# Patient Record
Sex: FEMALE | Race: ASIAN | NOT HISPANIC OR LATINO | Employment: UNEMPLOYED | URBAN - METROPOLITAN AREA
[De-identification: names, ages, dates, MRNs, and addresses within clinical notes are randomized per-mention and may not be internally consistent; named-entity substitution may affect disease eponyms.]

---

## 2017-10-05 ENCOUNTER — COMMUNICATION - HEALTHEAST (OUTPATIENT)
Dept: FAMILY MEDICINE | Facility: CLINIC | Age: 52
End: 2017-10-05

## 2017-10-05 ENCOUNTER — OFFICE VISIT - HEALTHEAST (OUTPATIENT)
Dept: FAMILY MEDICINE | Facility: CLINIC | Age: 52
End: 2017-10-05

## 2017-10-05 DIAGNOSIS — R92.8 ABNORMAL MAMMOGRAM: ICD-10-CM

## 2017-10-05 DIAGNOSIS — Z23 NEED FOR INFLUENZA VACCINATION: ICD-10-CM

## 2017-10-05 DIAGNOSIS — Z71.84 TRAVEL ADVICE ENCOUNTER: ICD-10-CM

## 2017-10-05 ASSESSMENT — MIFFLIN-ST. JEOR: SCORE: 1061.75

## 2017-11-01 ENCOUNTER — RECORDS - HEALTHEAST (OUTPATIENT)
Dept: ADMINISTRATIVE | Facility: OTHER | Age: 52
End: 2017-11-01

## 2017-12-11 ENCOUNTER — RECORDS - HEALTHEAST (OUTPATIENT)
Dept: MAMMOGRAPHY | Facility: CLINIC | Age: 52
End: 2017-12-11

## 2017-12-11 DIAGNOSIS — R92.8 OTHER ABNORMAL AND INCONCLUSIVE FINDINGS ON DIAGNOSTIC IMAGING OF BREAST: ICD-10-CM

## 2017-12-11 DIAGNOSIS — Z12.31 ENCOUNTER FOR SCREENING MAMMOGRAM FOR MALIGNANT NEOPLASM OF BREAST: ICD-10-CM

## 2017-12-19 ENCOUNTER — RECORDS - HEALTHEAST (OUTPATIENT)
Dept: ADMINISTRATIVE | Facility: OTHER | Age: 52
End: 2017-12-19

## 2018-02-02 ENCOUNTER — RECORDS - HEALTHEAST (OUTPATIENT)
Dept: GENERAL RADIOLOGY | Facility: CLINIC | Age: 53
End: 2018-02-02

## 2018-02-02 ENCOUNTER — OFFICE VISIT - HEALTHEAST (OUTPATIENT)
Dept: FAMILY MEDICINE | Facility: CLINIC | Age: 53
End: 2018-02-02

## 2018-02-02 DIAGNOSIS — R50.9 FEVER, UNSPECIFIED: ICD-10-CM

## 2018-02-02 DIAGNOSIS — N39.0 UTI (URINARY TRACT INFECTION): ICD-10-CM

## 2018-02-02 DIAGNOSIS — J02.0 STREP THROAT: ICD-10-CM

## 2018-02-02 DIAGNOSIS — R93.89 ABNORMAL CXR: ICD-10-CM

## 2018-02-02 DIAGNOSIS — R50.9 FEVER: ICD-10-CM

## 2018-02-02 DIAGNOSIS — R07.0 THROAT PAIN: ICD-10-CM

## 2018-02-02 LAB
ALBUMIN SERPL-MCNC: 3.4 G/DL (ref 3.5–5)
ALBUMIN UR-MCNC: ABNORMAL MG/DL
ALP SERPL-CCNC: 120 U/L (ref 45–120)
ALT SERPL W P-5'-P-CCNC: 17 U/L (ref 0–45)
ANION GAP SERPL CALCULATED.3IONS-SCNC: 16 MMOL/L (ref 5–18)
APPEARANCE UR: CLEAR
AST SERPL W P-5'-P-CCNC: 15 U/L (ref 0–40)
BACTERIA #/AREA URNS HPF: ABNORMAL HPF
BILIRUB SERPL-MCNC: 0.6 MG/DL (ref 0–1)
BILIRUB UR QL STRIP: NEGATIVE
BUN SERPL-MCNC: 11 MG/DL (ref 8–22)
CALCIUM SERPL-MCNC: 9.8 MG/DL (ref 8.5–10.5)
CHLORIDE BLD-SCNC: 98 MMOL/L (ref 98–107)
CO2 SERPL-SCNC: 24 MMOL/L (ref 22–31)
COLOR UR AUTO: YELLOW
CREAT SERPL-MCNC: 0.79 MG/DL (ref 0.6–1.1)
DEPRECATED S PYO AG THROAT QL EIA: NORMAL
FLUAV AG SPEC QL IA: NORMAL
FLUBV AG SPEC QL IA: NORMAL
GFR SERPL CREATININE-BSD FRML MDRD: >60 ML/MIN/1.73M2
GLUCOSE BLD-MCNC: 104 MG/DL (ref 70–125)
GLUCOSE UR STRIP-MCNC: NEGATIVE MG/DL
HGB UR QL STRIP: ABNORMAL
KETONES UR STRIP-MCNC: ABNORMAL MG/DL
LEUKOCYTE ESTERASE UR QL STRIP: ABNORMAL
NITRATE UR QL: POSITIVE
PH UR STRIP: 6.5 [PH] (ref 5–8)
POTASSIUM BLD-SCNC: 3.5 MMOL/L (ref 3.5–5)
PROT SERPL-MCNC: 8.7 G/DL (ref 6–8)
RBC #/AREA URNS AUTO: ABNORMAL HPF
SODIUM SERPL-SCNC: 138 MMOL/L (ref 136–145)
SP GR UR STRIP: 1.01 (ref 1–1.03)
SQUAMOUS #/AREA URNS AUTO: ABNORMAL LPF
UROBILINOGEN UR STRIP-ACNC: ABNORMAL
WBC #/AREA URNS AUTO: >100 HPF

## 2018-02-03 LAB — GROUP A STREP BY PCR: ABNORMAL

## 2018-02-04 LAB — BACTERIA SPEC CULT: ABNORMAL

## 2018-02-05 ENCOUNTER — COMMUNICATION - HEALTHEAST (OUTPATIENT)
Dept: LAB | Facility: CLINIC | Age: 53
End: 2018-02-05

## 2018-02-06 ENCOUNTER — RECORDS - HEALTHEAST (OUTPATIENT)
Dept: ADMINISTRATIVE | Facility: OTHER | Age: 53
End: 2018-02-06

## 2018-02-09 ENCOUNTER — OFFICE VISIT - HEALTHEAST (OUTPATIENT)
Dept: FAMILY MEDICINE | Facility: CLINIC | Age: 53
End: 2018-02-09

## 2018-02-09 ENCOUNTER — COMMUNICATION - HEALTHEAST (OUTPATIENT)
Dept: FAMILY MEDICINE | Facility: CLINIC | Age: 53
End: 2018-02-09

## 2018-02-09 DIAGNOSIS — R93.89 ABNORMAL CXR: ICD-10-CM

## 2018-02-09 DIAGNOSIS — M54.9 BACKACHE: ICD-10-CM

## 2018-02-09 DIAGNOSIS — B34.9 VIRAL SYNDROME: ICD-10-CM

## 2018-02-09 ASSESSMENT — MIFFLIN-ST. JEOR: SCORE: 1034.53

## 2018-02-19 ENCOUNTER — HOSPITAL ENCOUNTER (OUTPATIENT)
Dept: CT IMAGING | Facility: HOSPITAL | Age: 53
Discharge: HOME | End: 2018-02-19
Attending: FAMILY MEDICINE

## 2018-02-19 DIAGNOSIS — R93.89 ABNORMAL CXR: ICD-10-CM

## 2018-02-23 ENCOUNTER — OFFICE VISIT - HEALTHEAST (OUTPATIENT)
Dept: FAMILY MEDICINE | Facility: CLINIC | Age: 53
End: 2018-02-23

## 2018-02-23 DIAGNOSIS — N39.0 UTI (URINARY TRACT INFECTION): ICD-10-CM

## 2018-02-23 DIAGNOSIS — J98.4 APICAL LUNG SCARRING: ICD-10-CM

## 2018-02-23 DIAGNOSIS — M85.80 OSTEOPENIA: ICD-10-CM

## 2018-03-07 ENCOUNTER — RECORDS - HEALTHEAST (OUTPATIENT)
Dept: ADMINISTRATIVE | Facility: OTHER | Age: 53
End: 2018-03-07

## 2018-05-25 ENCOUNTER — RECORDS - HEALTHEAST (OUTPATIENT)
Dept: ADMINISTRATIVE | Facility: OTHER | Age: 53
End: 2018-05-25

## 2018-07-13 ENCOUNTER — RECORDS - HEALTHEAST (OUTPATIENT)
Dept: ADMINISTRATIVE | Facility: OTHER | Age: 53
End: 2018-07-13

## 2018-09-27 ENCOUNTER — RECORDS - HEALTHEAST (OUTPATIENT)
Dept: ADMINISTRATIVE | Facility: OTHER | Age: 53
End: 2018-09-27

## 2018-10-09 ENCOUNTER — OFFICE VISIT - HEALTHEAST (OUTPATIENT)
Dept: FAMILY MEDICINE | Facility: CLINIC | Age: 53
End: 2018-10-09

## 2018-10-09 DIAGNOSIS — J98.4 APICAL LUNG SCARRING: ICD-10-CM

## 2018-10-09 DIAGNOSIS — R07.89 CHEST WALL PAIN: ICD-10-CM

## 2018-10-09 DIAGNOSIS — H93.19 TINNITUS, UNSPECIFIED LATERALITY: ICD-10-CM

## 2018-10-30 ENCOUNTER — OFFICE VISIT - HEALTHEAST (OUTPATIENT)
Dept: AUDIOLOGY | Facility: CLINIC | Age: 53
End: 2018-10-30

## 2018-10-30 ENCOUNTER — OFFICE VISIT - HEALTHEAST (OUTPATIENT)
Dept: OTOLARYNGOLOGY | Facility: CLINIC | Age: 53
End: 2018-10-30

## 2018-10-30 DIAGNOSIS — H90.A22 SENSORINEURAL HEARING LOSS (SNHL) OF LEFT EAR WITH RESTRICTED HEARING OF RIGHT EAR: ICD-10-CM

## 2018-10-30 DIAGNOSIS — H90.3 ASNHL (ASYMMETRICAL SENSORINEURAL HEARING LOSS): ICD-10-CM

## 2018-10-30 DIAGNOSIS — H93.11 TINNITUS, RIGHT: ICD-10-CM

## 2018-10-30 DIAGNOSIS — H93.11 TINNITUS OF RIGHT EAR: ICD-10-CM

## 2019-01-03 ENCOUNTER — OFFICE VISIT - HEALTHEAST (OUTPATIENT)
Dept: FAMILY MEDICINE | Facility: CLINIC | Age: 54
End: 2019-01-03

## 2019-01-03 DIAGNOSIS — R07.89 CHEST WALL PAIN: ICD-10-CM

## 2019-01-03 RX ORDER — CLONAZEPAM 0.5 MG/1
0.5 TABLET ORAL DAILY
Status: SHIPPED | COMMUNITY
Start: 2019-01-03 | End: 2022-02-07

## 2019-01-03 RX ORDER — ESCITALOPRAM OXALATE 20 MG/1
20 TABLET ORAL DAILY
Status: SHIPPED | COMMUNITY
Start: 2019-01-03 | End: 2022-03-15

## 2019-08-05 ENCOUNTER — OFFICE VISIT - HEALTHEAST (OUTPATIENT)
Dept: FAMILY MEDICINE | Facility: CLINIC | Age: 54
End: 2019-08-05

## 2019-08-05 DIAGNOSIS — R07.89 CHEST WALL PAIN: ICD-10-CM

## 2019-08-05 DIAGNOSIS — M25.572 PAIN IN JOINT, ANKLE AND FOOT, LEFT: ICD-10-CM

## 2019-08-05 RX ORDER — INDOMETHACIN 25 MG/1
CAPSULE ORAL
Qty: 30 CAPSULE | Refills: 1 | Status: SHIPPED | OUTPATIENT
Start: 2019-08-05 | End: 2022-05-31

## 2019-08-05 RX ORDER — HYDROXYZINE PAMOATE 25 MG/1
25 CAPSULE ORAL 3 TIMES DAILY PRN
Status: SHIPPED | COMMUNITY
Start: 2019-08-05 | End: 2022-03-15

## 2019-08-05 RX ORDER — CYCLOBENZAPRINE HCL 5 MG
TABLET ORAL
Qty: 30 TABLET | Refills: 1 | Status: SHIPPED | OUTPATIENT
Start: 2019-08-05 | End: 2022-02-07

## 2019-08-05 ASSESSMENT — MIFFLIN-ST. JEOR: SCORE: 1083.97

## 2019-12-09 ENCOUNTER — OFFICE VISIT - HEALTHEAST (OUTPATIENT)
Dept: FAMILY MEDICINE | Facility: CLINIC | Age: 54
End: 2019-12-09

## 2019-12-09 DIAGNOSIS — G44.52 NEW DAILY PERSISTENT HEADACHE: ICD-10-CM

## 2019-12-09 DIAGNOSIS — M54.50 CHRONIC MIDLINE LOW BACK PAIN WITHOUT SCIATICA: ICD-10-CM

## 2019-12-09 DIAGNOSIS — R07.89 CHEST WALL PAIN: ICD-10-CM

## 2019-12-09 DIAGNOSIS — G89.29 CHRONIC MIDLINE LOW BACK PAIN WITHOUT SCIATICA: ICD-10-CM

## 2019-12-10 ENCOUNTER — HOSPITAL ENCOUNTER (OUTPATIENT)
Dept: CT IMAGING | Facility: HOSPITAL | Age: 54
Discharge: HOME | End: 2019-12-10
Attending: FAMILY MEDICINE

## 2019-12-10 DIAGNOSIS — G44.52 NEW DAILY PERSISTENT HEADACHE: ICD-10-CM

## 2019-12-11 ENCOUNTER — COMMUNICATION - HEALTHEAST (OUTPATIENT)
Dept: FAMILY MEDICINE | Facility: CLINIC | Age: 54
End: 2019-12-11

## 2020-06-25 ENCOUNTER — COMMUNICATION - HEALTHEAST (OUTPATIENT)
Dept: FAMILY MEDICINE | Facility: CLINIC | Age: 55
End: 2020-06-25

## 2020-09-29 ENCOUNTER — AMBULATORY - HEALTHEAST (OUTPATIENT)
Dept: FAMILY MEDICINE | Facility: CLINIC | Age: 55
End: 2020-09-29

## 2020-09-29 DIAGNOSIS — Z12.31 VISIT FOR SCREENING MAMMOGRAM: ICD-10-CM

## 2021-05-31 VITALS — WEIGHT: 125 LBS | BODY MASS INDEX: 26.24 KG/M2 | HEIGHT: 58 IN

## 2021-05-31 NOTE — PROGRESS NOTES
"ASSESSMENT/PLAN:  1. Pain in joint, ankle and foot, left  Ambulatory referral to Orthopedics   2. Chest wall pain  cyclobenzaprine (FLEXERIL) 5 MG tablet    indomethacin (INDOCIN) 25 MG capsule       This is a 55 yo female with chronic back pain after reported work injury (a few years ago).  She is followed by Ortho for her back concerns.  She is here today with:  1.  Pain in left ankle - this is accompanied by \"cracking\" sounds with ROM.  She believes this started after an injection in her back.  It was her 7th injection of some sort and she tells me they \"put me to sleep\".  She is convinced that the ankle symptoms have everything to do with this injection.  REview of records would suggest that her last injection was a radiofrequency ablation of a lumbar nerve root.  I reassured her that they did not do anything to her ankle at that visit.  She does have fairly pronounced \"grinding\" in that ankle - now with decreased walking due to the symptoms.  I think she needs an ortho referral for further evaluation of this ankle - I will defer any imaging to that provider.  2.  Chest wall pain - this resulted from her reported work accident.  She has taken Cyclobenzaprine as needed for chest pain - \"it helps\" - she got a new prescription in January (> 6 months ago) for #30 and still has a couple pills left.  She requests a refill today.  This seems reasonable.  She is also looking for pain relief for her ankle/chest.  Will renew her Indomethacin to use as needed for pain.   No follow-ups on file.      Medications Discontinued During This Encounter   Medication Reason     cyclobenzaprine (FLEXERIL) 5 MG tablet Reorder     indomethacin (INDOCIN) 25 MG capsule Reorder     There are no Patient Instructions on file for this visit.    Chief Complaint:  Chief Complaint   Patient presents with     Leg Pain       HPI:   Dedra Montero is a 54 y.o. female c/o  Needs a refill on medication - sent to [facility] - Cyclobenzaprine  5 mg " "  Takes for chest pain -     Left leg - still numb - had injury at work -   Ankle is cracking - difficult to walk - doesn't hurt - just the numbness  Low back pain is the problem - whether sitting or standing  Has been getting injections - hasn't been helpful yet  Last injection was about 5 months ago -   [facility] is seeing her for the back  Injured in lower back, radiates to left leg, but never to the ankle    They have not addressed the ankle as well - just started 4-5 months ago  No specific injury to the ankle -   No pain, just numbness and tingling -   Now, cracking - but not painful    Had total of 7 injections - in her back -   For 7th injection they \"put me to sleep\"        PMH:   Patient Active Problem List    Diagnosis Date Noted     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     Vitamin D deficiency 08/02/2016     Health care maintenance 01/28/2016     Pyelonephritis, unspecified 01/07/2015     Chest Pain      Hearing Loss      Peripheral Neuropathy      Arthralgia      Abnormal Pap Smear Of Cervix      Past Medical History:   Diagnosis Date     Menopause 2014     Peripheral neuropathy      Past Surgical History:   Procedure Laterality Date     NO PAST SURGERIES       Social History     Socioeconomic History     Marital status:      Spouse name: Not on file     Number of children: Not on file     Years of education: Not on file     Highest education level: Not on file   Occupational History     Occupation:    Social Needs     Financial resource strain: Not on file     Food insecurity:     Worry: Not on file     Inability: Not on file     Transportation needs:     Medical: Not on file     Non-medical: Not on file   Tobacco Use     Smoking status: Never Smoker     Smokeless tobacco: Never Used   Substance and Sexual Activity     Alcohol use: No     Drug use: No     Sexual activity: Yes     Partners: Male     Birth control/protection: Post-menopausal   Lifestyle     Physical activity:     Days " per week: Not on file     Minutes per session: Not on file     Stress: Not on file   Relationships     Social connections:     Talks on phone: Not on file     Gets together: Not on file     Attends Zoroastrian service: Not on file     Active member of club or organization: Not on file     Attends meetings of clubs or organizations: Not on file     Relationship status: Not on file     Intimate partner violence:     Fear of current or ex partner: Not on file     Emotionally abused: Not on file     Physically abused: Not on file     Forced sexual activity: Not on file   Other Topics Concern     Not on file   Social History Narrative    The patient lives with  and family.    Born in [facility], in [facility] x 13 years, to  1990     Family History   Problem Relation Age of Onset     No Medical Problems Mother      No Medical Problems Father      No Medical Problems Sister      No Medical Problems Brother      No Medical Problems Brother      No Medical Problems Brother      No Medical Problems Sister      No Medical Problems Sister      No Medical Problems Sister        Meds:    Current Outpatient Medications:      clonazePAM (KLONOPIN) 0.5 MG tablet, Take 0.5 mg by mouth daily., Disp: , Rfl:      cyclobenzaprine (FLEXERIL) 5 MG tablet, 1 po two times a day as needed for chest pain, Disp: 30 tablet, Rfl: 1     escitalopram oxalate (LEXAPRO) 20 MG tablet, Take 20 mg by mouth daily., Disp: , Rfl:      hydrOXYzine pamoate (VISTARIL) 25 MG capsule, Take 25 mg by mouth 3 (three) times a day as needed for itching., Disp: , Rfl:      cholecalciferol, vitamin D3, 2,000 unit cap, Take 2,000 Units by mouth daily., Disp: 30 each, Rfl: 6     indomethacin (INDOCIN) 25 MG capsule, 1 PO Q 8-12 HOURS AS NEEDED FOR CHEST WALL PAIN, Disp: 30 capsule, Rfl: 1    Allergies:  No Known Allergies    ROS:  Pertinent positives as noted in HPI; otherwise 12 point ROS negative.      Physical Exam:  EXAM:  /60 (Patient Site: Right Arm, Patient  "Position: Sitting, Cuff Size: Adult Regular)   Pulse 71   Temp 97.8  F (36.6  C) (Oral)   Resp 16   Ht 4' 10\" (1.473 m)   Wt 129 lb 14.4 oz (58.9 kg)   LMP 03/13/2015   Breastfeeding? No   BMI 27.15 kg/m     Gen:  NAD, appears well, well-hydrated  HEENT:  TMs nl, oropharynx benign, nasal mucosa nl, conjunctiva clear  Neck:  Supple, no adenopathy, no thyromegaly, no carotid bruits, no JVD  Lungs:  Clear to auscultation bilaterally  Chest Wall - no significant tenderness to palpation today  Cor:  RRR no murmur  Abd:  Soft, nontender, BS+, no masses, no guarding or rebound, no HSM  Back:  Decreased ROM lumbar spine  Extr:  Left ankle makes stretching/cracking noise with ROM - no instability noted, nl ROM  Neuro:  No asymmetry, Nl motor tone/strength, decreased sensation to touch in LLE, reflexes =, gait nl, nl coordination, CN intact,   Skin:  Warm/dry        Results:  "

## 2021-06-01 VITALS — WEIGHT: 119 LBS | BODY MASS INDEX: 24.98 KG/M2 | HEIGHT: 58 IN

## 2021-06-01 VITALS — WEIGHT: 123 LBS | BODY MASS INDEX: 25.71 KG/M2

## 2021-06-01 VITALS — WEIGHT: 123.5 LBS | BODY MASS INDEX: 25.81 KG/M2

## 2021-06-02 VITALS — BODY MASS INDEX: 26.75 KG/M2 | WEIGHT: 128 LBS

## 2021-06-02 VITALS — WEIGHT: 125 LBS | BODY MASS INDEX: 26.13 KG/M2

## 2021-06-03 VITALS — HEIGHT: 58 IN | WEIGHT: 129.9 LBS | BODY MASS INDEX: 27.27 KG/M2

## 2021-06-04 VITALS
SYSTOLIC BLOOD PRESSURE: 120 MMHG | HEART RATE: 69 BPM | DIASTOLIC BLOOD PRESSURE: 60 MMHG | OXYGEN SATURATION: 97 % | TEMPERATURE: 97.5 F | BODY MASS INDEX: 26.75 KG/M2 | WEIGHT: 128 LBS

## 2021-06-04 NOTE — TELEPHONE ENCOUNTER
"Attempted to call the patient and got a message saying,\" to the subscriber's request, this person is not accepting incoming calls.\" Will try again later.  "

## 2021-06-04 NOTE — PROGRESS NOTES
"ASSESSMENT/PLAN:  1. New daily persistent headache  CT Head Without Contrast    ibuprofen (ADVIL,MOTRIN) 600 MG tablet   2. Chest wall pain     3. Chronic midline low back pain without sciatica  ibuprofen (ADVIL,MOTRIN) 600 MG tablet       This is a 55 yo female with:  1.  New daily persistent headache - no red flag neuro symptoms, but patient is insistent that \"something is wrong in her head\".  We will treat with Ibuprofen as needed for pain.  Will also check Ct - head.  2.  Chest Wall pain - persistent - no signs of underlying disease - treat symptomatically with Ibuprofen.   3.  Chronic low back pain - treat with Ibuprofen.   Return in about 1 month (around 1/9/2020) for if not getting better.      There are no discontinued medications.  There are no Patient Instructions on file for this visit.    Chief Complaint:  Chief Complaint   Patient presents with     Headache     right side of head     Pain     left hip and foot       HPI:   Dedra Montero is a 54 y.o. female c/o  1.  Low back pain x 2-3 years  2.  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     Vitamin D deficiency 08/02/2016     Health care maintenance 01/28/2016     Pyelonephritis, unspecified 01/07/2015     Chest Pain      Hearing Loss      Peripheral Neuropathy      Arthralgia      Abnormal Pap Smear Of Cervix      Past Medical History:   Diagnosis Date     Menopause 2014     Peripheral neuropathy      Past Surgical History:   Procedure Laterality Date     NO PAST SURGERIES       Social History     Socioeconomic History     Marital status:      Spouse name: Not on file     Number of children: Not on file     " Years of education: Not on file     Highest education level: Not on file   Occupational History     Occupation:    Social Needs     Financial resource strain: Not on file     Food insecurity:     Worry: Not on file     Inability: Not on file     Transportation needs:     Medical: Not on file     Non-medical: Not on file   Tobacco Use     Smoking status: Never Smoker     Smokeless tobacco: Never Used   Substance and Sexual Activity     Alcohol use: No     Drug use: No     Sexual activity: Yes     Partners: Male     Birth control/protection: Post-menopausal   Lifestyle     Physical activity:     Days per week: Not on file     Minutes per session: Not on file     Stress: Not on file   Relationships     Social connections:     Talks on phone: Not on file     Gets together: Not on file     Attends Druze service: Not on file     Active member of club or organization: Not on file     Attends meetings of clubs or organizations: Not on file     Relationship status: Not on file     Intimate partner violence:     Fear of current or ex partner: Not on file     Emotionally abused: Not on file     Physically abused: Not on file     Forced sexual activity: Not on file   Other Topics Concern     Not on file   Social History Narrative    The patient lives with  and family.    Born in [facility], in [facility] x 13 years, to  1990     Family History   Problem Relation Age of Onset     No Medical Problems Mother      No Medical Problems Father      No Medical Problems Sister      No Medical Problems Brother      No Medical Problems Brother      No Medical Problems Brother      No Medical Problems Sister      No Medical Problems Sister      No Medical Problems Sister        Meds:    Current Outpatient Medications:      cholecalciferol, vitamin D3, 2,000 unit cap, Take 2,000 Units by mouth daily., Disp: 30 each, Rfl: 6     clonazePAM (KLONOPIN) 0.5 MG tablet, Take 0.5 mg by mouth daily., Disp: , Rfl:       cyclobenzaprine (FLEXERIL) 5 MG tablet, 1 po two times a day as needed for chest pain, Disp: 30 tablet, Rfl: 1     escitalopram oxalate (LEXAPRO) 20 MG tablet, Take 20 mg by mouth daily., Disp: , Rfl:      hydrOXYzine pamoate (VISTARIL) 25 MG capsule, Take 25 mg by mouth 3 (three) times a day as needed for itching., Disp: , Rfl:      indomethacin (INDOCIN) 25 MG capsule, 1 PO Q 8-12 HOURS AS NEEDED FOR CHEST WALL PAIN, Disp: 30 capsule, Rfl: 1     ibuprofen (ADVIL,MOTRIN) 600 MG tablet, Take 1 tablet (600 mg total) by mouth every 6 (six) hours as needed for pain., Disp: 100 tablet, Rfl: 1    Allergies:  No Known Allergies    ROS:  Pertinent positives as noted in HPI; otherwise 12 point ROS negative.      Physical Exam:  EXAM:  /60 (Patient Site: Left Arm, Patient Position: Sitting, Cuff Size: Adult Regular)   Pulse 69   Temp 97.5  F (36.4  C) (Oral)   Wt 128 lb (58.1 kg)   LMP 03/13/2015   SpO2 97%   BMI 26.75 kg/m     Gen:  NAD, appears well, well-hydrated  HEENT:  TMs nl, oropharynx benign, nasal mucosa nl, conjunctiva clear, PERRL, EOMI  Neck:  Supple, no adenopathy, no thyromegaly, no carotid bruits, no JVD  Lungs:  Clear to auscultation bilaterally  Cor:  RRR no murmur  Abd:  Soft, nontender, BS+, no masses, no guarding or rebound, no HSM  Extr:  Neg.  Neuro:  No asymmetry, Nl motor tone/strength, nl sensation, reflexes =, gait nl, nl coordination, CN intact,   Skin:  Warm/dry        Results:

## 2021-06-04 NOTE — TELEPHONE ENCOUNTER
Called and left message on [phone] to have patient return call. Okay to relay message below.

## 2021-06-04 NOTE — TELEPHONE ENCOUNTER
Patient Returning Call  Reason for call:  Returning clinic call.  Information relayed to patient:  With  # [id number] on the line with the caller, the patient was given the normal head CT result, patient was advised  to follow-up in clinic with any persisting symptom for further evaluation.  Patient expressed understanding of the information given.  Patient has additional questions:  No  If YES, what are your questions/concerns:  NA  Okay to leave a detailed message?: No call back needed

## 2021-06-04 NOTE — TELEPHONE ENCOUNTER
----- Message from Lilian Dunbar MD sent at 12/11/2019  2:47 PM CST -----  Please notify patient that her CT scan of her head is normal - nothing to suggest a stroke or other cause for her symptoms.

## 2021-06-13 NOTE — PROGRESS NOTES
Going November 6   One month.    [facility]    Travel advice.  No issues    ROS: as noted above    OBJECTIVE:   Vitals:    10/05/17 1534   BP: 101/69   Pulse: (!) 57   Resp: 20      Eyes: non icteric, noninflamed  Lungs: no resp distress  Heart: regular  Ankles: no edema  Muscles: nontender  Mental status: euthymic  Neuro: nonfocal  Chart review shows abnl mammogram lost to follow up  ASSESSMENT/PLAN:    1. Need for influenza vaccination  Influenza, Seasonal Quad, Preservative Free 36+ Months   2. Travel advice encounter  doxycycline (MONODOX) 100 MG capsule    typhoid (VIVOTIF) SR capsule   3. Abnormal mammogram  Mammo Screening Bilateral     More than 15 of 25 minutes total time spent education counseling regarding the issues and care of same as listed in the assessment and plan of this note  Will assist in follow up studies of abnl mammogram a year ago.  Discussion education

## 2021-06-15 NOTE — PROGRESS NOTES
ASSESSMENT/PLAN:  Pleasant 52 y.o.  female presents with fever and found to have UTI.  I will give her Bactrim.  The patient may continue with OTC symptomatic treatment.  Rest, hydration, nutritional support, and time were counseled.  I asked her follow up with her primary care provider next week.  Incidentally a chest x-ray which was done to evaluate the fever is found to have abnormality at the left clavicular and rib areas.  A chest CT scan was ordered and the patient will call to schedule that CT scan and follow-up with her primary care provider regarding the results and the management. The patient verbalized understanding and agreed with the plan.      Fever, Throat pain, dysuria, cough  -     Rapid Strep A Screen-Throat  -     Influenza A/B Rapid Test  -     Group A Strep, RNA Direct Detection, Throat  -     XR Chest 2 Views; Future  -     HM1(CBC and Differential)  -     Comprehensive Metabolic Panel  -     HM1 (CBC with Diff)  -     Culture, Urine  -     Urinalysis    Abnormal CXR  -     CT Chest With and Without Contrast; Future    UTI (urinary tract infection)  -     sulfamethoxazole-trimethoprim (BACTRIM DS) 800-160 mg per tablet; Take 1 tablet by mouth 2 (two) times a day for 10 days.    SUBJECTIVE:    Dedra Montero is a 52 y.o. female who comes in today with her  for 1 week of fever with a T-max noted of 103.1.  In addition to the fever, she endorsed chills, myalgia, malaise, nausea, decrease in appetite, sore throat, cough, dysuria, abdominal pain, back pain, headache, chest pain, shortness of breath.  She has been medicating with Tylenol and Advil with some minimal relief only to find recurrence of her fever.  Her  was sick with similar symptoms a couple weeks ago and has been better with the use of antibiotics.  The patient and  travel to [facility] a month ago from November to December and was given malaria prophylactic treatment and typhoid vaccine for which she completed.  The patient is  otherwise very healthy and takes no chronic disease medications.  She is not a smoker.  She denies rash, neck stiffness, numbness and weakness of her extremities, bleeding per mouth/nose/urine/stool, diarrhea, vomiting.    Review of Systems (except those mentioned above)  Constitutional: Negative.   HENT: Negative.   Eyes: Negative.   Respiratory: Negative.   Cardiovascular: Negative.   Gastrointestinal: Negative.   Endocrine: Negative.   Genitourinary: Negative.   Musculoskeletal: Negative.   Skin: Negative.   Allergic/Immunologic: Negative.   Neurological: Negative.   Hematological: Negative.   Psychiatric/Behavioral: Negative.     Patient Active Problem List    Diagnosis Date Noted     Vitamin D deficiency 08/02/2016     Health care maintenance 01/28/2016     Pyelonephritis, unspecified 01/07/2015     Chest Pain      Hearing Loss      Peripheral Neuropathy      Arthralgia      Abnormal Pap Smear Of Cervix      No Known Allergies  Current Outpatient Prescriptions   Medication Sig Dispense Refill     cholecalciferol, vitamin D3, 2,000 unit cap Take 2,000 Units by mouth daily. 30 each 6     HYDROcodone-acetaminophen 5-325 mg per tablet        ibuprofen (ADVIL,MOTRIN) 600 MG tablet Take 1 tablet (600 mg total) by mouth as needed. 100 tablet 1     sulfamethoxazole-trimethoprim (BACTRIM DS) 800-160 mg per tablet Take 1 tablet by mouth 2 (two) times a day for 10 days. 20 tablet 0     typhoid (VIVOTIF) SR capsule Take 1 capsule by mouth every other day. 4 capsule 0     No current facility-administered medications for this visit.      Past Medical History:   Diagnosis Date     Menopause 2014     Peripheral neuropathy      Past Surgical History:   Procedure Laterality Date     NO PAST SURGERIES       Social History     Social History     Marital status:      Spouse name: N/A     Number of children: N/A     Years of education: N/A     Occupational History           Social History Main Topics     Smoking  status: Never Smoker     Smokeless tobacco: Never Used     Alcohol use No     Drug use: No     Sexual activity: Yes     Partners: Male     Birth control/ protection: Post-menopausal     Other Topics Concern     None     Social History Narrative    The patient lives with  and family.    Born in [facility], in [facility] x 13 years, to  1990     Family History   Problem Relation Age of Onset     No Medical Problems Mother      No Medical Problems Father      No Medical Problems Sister      No Medical Problems Brother      No Medical Problems Brother      No Medical Problems Brother      No Medical Problems Sister      No Medical Problems Sister      No Medical Problems Sister          OBJECTIVE:    Vitals:    02/02/18 1416   BP: 120/63   Pulse: 87   Resp: 24   Temp: 98  F (36.7  C)   TempSrc: Oral   SpO2: 97%   Weight: 123 lb (55.8 kg)     Body mass index is 25.71 kg/(m^2).    Physical Exam:  Constitutional: Patient is oriented to person, place, and time. Patient appears well-developed and well-nourished. No distress.   Head: Normocephalic and atraumatic.   Right Ear: External ear normal. Normal TM  Left Ear: External ear normal. Normal TM  Nose: Nose normal.   Mouth/Throat: Oropharynx is clear and moist. No oropharyngeal exudate.   Eyes: Conjunctivae and EOM are normal. Pupils are equal, round, and reactive to light. Right eye exhibits no discharge. Left eye exhibits no discharge. No scleral icterus.   Neck: Neck supple. No JVD present. No tracheal deviation present. No thyromegaly present.   Lymphadenopathy:  Patient has no cervical adenopathy.   Cardiovascular: Normal rate, regular rhythm, normal heart sounds and intact distal pulses. No murmur heard.   Pulmonary/Chest: Effort normal and breath sounds normal. No stridor. No respiratory distress. Patient has no wheezes, no rales, exhibits no tenderness.   Abdominal: Soft. Bowel sounds are normal. Patient exhibits no distension and no mass. There is no tenderness.  There is no rebound and no guarding.   Neurological: Patient is alert and oriented to person, place, and time. Patient has normal reflexes. No cranial nerve deficit. Coordination normal.   Skin: Skin is warm and dry. No rash noted. Patient is not diaphoretic. No erythema. No pallor.     Xr Chest 2 Views    Result Date: 2/2/2018  XR CHEST 2 VIEWS 2/2/2018 3:30 PM INDICATION: Fever, unspecified COMPARISON: None. FINDINGS: Heart size and pulmonary vascularity normal. The lungs are clear. There are several lytic defects involving the medial left clavicle and questionable lucencies involving the posterior left fourth rib. CT recommended for further evaluation.      Results for orders placed or performed in visit on 02/02/18   Rapid Strep A Screen-Throat   Result Value Ref Range    Rapid Strep A Antigen No Group A Strep detected, presumptive negative No Group A Strep detected, presumptive negative   Influenza A/B Rapid Test   Result Value Ref Range    Influenza  A, Rapid Antigen No Influenza A antigen detected No Influenza A antigen detected    Influenza B, Rapid Antigen No Influenza B antigen detected No Influenza B antigen detected   Urinalysis   Result Value Ref Range    Color, UA Yellow Colorless, Yellow, Straw, Light Yellow    Clarity, UA Clear Clear    Glucose, UA Negative Negative    Bilirubin, UA Negative Negative    Ketones, UA 80 mg/dL (!) Negative    Specific Gravity, UA 1.015 1.005 - 1.030    Blood, UA Small (!) Negative    pH, UA 6.5 5.0 - 8.0    Protein,  mg/dL (!) Negative mg/dL    Urobilinogen, UA 1.0 E.U./dL 0.2 E.U./dL, 1.0 E.U./dL    Nitrite, UA Positive (!) Negative    Leukocytes, UA Moderate (!) Negative

## 2021-06-15 NOTE — PROGRESS NOTES
Seen a week ago for upper resp sx which are better.  Had cxr with incidental lytic left medial clav lesions.  Needs assist in getting follow up ct .     Denies sx or hx injuries.    8/2017 injury   At Work.   Went to Chiro.  Wants analgesic. Ptherapy done.  Saw other providers.  Was prescribed meds.  meloxicam and tizanidine.  Now not able to follow up as,    All denied and other doctors not paid.  Work said it was not work related.  Dispute re incident.  Declined as work related.    Left si area.  Initially pain went every where.  Then got injection. Was numb and painful.  The numb is gone but pain still there.   They did not let her go back to work    Denies hx stomach issues    ROS: as noted above    OBJECTIVE:   Vitals:    02/09/18 1139   BP: 118/64   Pulse: 80   Resp: 20   Temp: 97.2  F (36.2  C)   SpO2: 98%      Wt is noted.  No diaphoresis  Eyes: nl eom, anicteric   External ears, nose: nl    Neck: nl nodes, supple, thyroid normal   Lungs: clear to ausc   Heart: regular rhythm  Abd: soft nontender   No cva (renal) tenderness  Neuro: no weakness  Skin no rash  Joints: uninflamed   No ketotic breath odor noted  Mental: euthymic  Ext: nontender calves   Gait: normal    Left medial clavicle nontender    Bmi:24 ,      Wt down ten pounds from a year and a half ago    Gait: normal  Spine: mild left si back tenderness.  Bends forward to 10 inches from floor  Neuro: Able to walk on heels and toes. In sitting position demonstrates equal strength with dorsiflexion, eversion and inversion of ankles.  Reflexes ankles and knees symmetric    Left slr  Post upper leg sxstraight leg raise  Skin: no rash    Has baseline one side pigmented skin    ASSESSMENT/PLAN:    Additional diagnoses and related orders:  1. Abnormal CXR     2. Backache  ibuprofen (ADVIL,MOTRIN) 600 MG tablet   3. Viral syndrome       Viral sx resolve  Incidental clav lytic lesions/ ct and follow up post  Wt loss ? Related  Back pain with dispute re work  comp./ nsaid    follow up after ct  Gi precautions

## 2021-06-16 PROBLEM — J98.4 APICAL LUNG SCARRING: Status: ACTIVE | Noted: 2018-02-23

## 2021-06-16 NOTE — PROGRESS NOTES
Recent uti.  Septra.  consistent with sensitivities.   Better but not gone.      Currently a little pain and need to urinate all the time.   When goes, just a few drops.  Finished nine days ago.    Denies vag sxs.    No blood, no new back pain, no fever.    Here to review left clavicle finding.  Incidental finding of ? Lytic.  No pain or tenderness    Vit D def denies bone pain    ROS: as noted above    OBJECTIVE:   Vitals:    02/23/18 1135   BP: 106/64   Pulse: 68      Eyes: non icteric, noninflamed  Lungs: no resp distress  Heart: regular  Ankles: no edema  Muscles: nontender  Mental status: euthymic  Neuro: nonfocal     No bone tenderness    Ct reviewed with pt    ASSESSMENT/PLAN:  uti sxs persist post antibx which should work/ trial macrobid and fluconazole.  follow up with primary if sxs persist    Reviewed discussed incidental cxr and ct findings.  No follow up needed.    Chronic issues stable/ same treatment.     Additional diagnoses and related orders:  1. UTI (urinary tract infection)  nitrofurantoin, macrocrystal-monohydrate, (MACROBID) 100 MG capsule    fluconazole (DIFLUCAN) 150 MG tablet   2. Osteopenia     3. Apical lung scarring

## 2021-06-20 NOTE — PROGRESS NOTES
Subjective: This patient comes in for evaluation is a 53-year-old male.  She has had some chest tightness for about a week.  Patient was evaluated back in February after she had a abnormal chest x-ray went on for CT scan of the lung showed some left apical benign scarring.    Patient states that it hurts to inhale it hurts in through the chest wall.    She has some tenderness to palpation on the left.    Also has had some ringing in the ear on the right and some decreased hearing in the ear on the left.    Mammogram reviewed she had a mammogram a year ago.    Patient denies any productive cough denies any fever has not really had any shortness of breath or decreased exercise tolerance.    Not been taking any medications.    She did get a flu shot today otherwise up-to-date.    Tobacco status: She  reports that she has never smoked. She has never used smokeless tobacco.    Patient Active Problem List    Diagnosis Date Noted     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     Vitamin D deficiency 08/02/2016     Health care maintenance 01/28/2016     Pyelonephritis, unspecified 01/07/2015     Chest Pain      Hearing Loss      Peripheral Neuropathy      Arthralgia      Abnormal Pap Smear Of Cervix        Current Outpatient Prescriptions   Medication Sig Dispense Refill     cholecalciferol, vitamin D3, 2,000 unit cap Take 2,000 Units by mouth daily. 30 each 6     indomethacin (INDOCIN) 25 MG capsule 1 PO Q 8-12 HOURS AS NEEDED FOR CHEST WALL PAIN 30 capsule 1     No current facility-administered medications for this visit.        ROS:   10 point review of systems negative other than as discussed above    Objective:    /60  Pulse 68  Resp 16  Wt 125 lb (56.7 kg)  LMP 03/13/2015  BMI 26.13 kg/m2  Body mass index is 26.13 kg/(m^2).      General appearance no acute distress  Vital signs were stable  Neck was supple no adenopathy oropharynx is clear.    Canals and TMs looked okay.  She has no drainage.    Skin was normal no  rashes through the chest or elsewhere    Lungs are clear throughout no rales or rhonchi heart was regular S1-S2.    She had some chest wall discomfort along the costochondral unction, upper left chest, no breast tenderness.  No axillary adenopathy    Abdomen nontender no guarding or rebound.    Extremities without edema.      Assessment:  1. Chest wall pain  indomethacin (INDOCIN) 25 MG capsule   2. Tinnitus, unspecified laterality  Ambulatory referral to ENT   3. Apical lung scarring       Chest wall pain will treat with indomethacin 25 mg 1 twice daily as needed    Tinnitus with some decreased hearing see ENT    Previous creatinine CT scan in February of last year showed normal lungs other than some apical scarring.    Previous mammogram from last year was normal    Plan: As outlined above    This transcription uses voice recognition software, which may contain typographical errors.

## 2021-06-21 NOTE — PROGRESS NOTES
Audiology Report    Referring Provider:   Service    History:  Dedra Montero is seen in conjunction with ENT appointment today. [place] [facility] [place] reports hearing loss in her left ear since she was a young girl. She reports using a hearing aid in her left ear about ten years ago. She reports she lost it quite some time ago. She reports tinnitus in her right ear. She reports she was dizzy last week for a few minutes with noticeable tinnitus. 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  She denies otalgia, otorrhea, family history of hearing loss or past ear surgery.     Results:     Left Ear Right Ear   Otoscopy clear canals clear canals   Pure Tone Audiometry Severe rising to moderately-severe sensorineural hearing loss   Normal hearing with mild sensorineural hearing loss at 2000 and 8000 Hz.    Word Recognition very poor as completed through an  excellent as completed through an    Tympanometry hypercompliant (Type Ad)  hypercompliant (Type Ad)     Transducer: Insert earphones and Circumaural headphones. Devaughn was negative at multiple frequencies tested.     Reliability was good  and there was good  SRT to PTA agreement.       Plan:  The patient is returned to ENT for follow up.  She should return for retesting with ENT recommendation. The patient is a candidate for a CROS or bone conduction hearing device pending medical clearance.     Please see audiogram under  media  and  audiogram  in the patient s chart.     Mayank Foster, CCC-A  [place] Licensed Audiologist #[id number]

## 2021-06-21 NOTE — PROGRESS NOTES
Dedra Montero is a 53 y.o. female seen in consultation at the request of Dr. Lundberg for tinnitus.  Patient has had hearing loss in her left ear since childhood as a result of a bad infection in that ear.  She tried a hearing aid per recommendations of another ENT group >10 years ago, but it does not work.  Her right ear has had tinnitus that has increased over the past year.  It is most noticeable in a quiet environment.  Denies hearing loss/changes in that ear.  Denies otologic history of infections (except as noted previously) or surgeries. Denies room-spinning vertigo. Does not think she's had any scans of her head, and if she did it was >10 years ago.    ALLERGY:  No Known Allergies    MEDICATIONS:     Current Outpatient Prescriptions on File Prior to Visit   Medication Sig Dispense Refill     cholecalciferol, vitamin D3, 2,000 unit cap Take 2,000 Units by mouth daily. 30 each 6     indomethacin (INDOCIN) 25 MG capsule 1 PO Q 8-12 HOURS AS NEEDED FOR CHEST WALL PAIN 30 capsule 1     No current facility-administered medications on file prior to visit.        Past Medical/Surgical History, Family History and Social History reviewed in detail and documented separately in the medical record.    Complete Review of Systems:  A 10-point review was performed.  Pertinent positives are noted in the HPI and on a separate scanned document in the chart.    EXAM:  There were no vitals filed for this visit.    Nurse documentation reviewed  and documented separately.    General Appearance: Pleasant, alert, appropriate appearance for age. No acute distress    Head Exam: Normal. Normocephalic, atraumatic.    Eye Exam: Normal external eye, conjunctiva, lids, cornea. Extra-ocular movements are intact.    Left external ear: normal  Left otoscopic exam: Normal EAC. Normal TM     Right external ear: normal  Right otoscopic exam: Normal EAC. Normal TM    Nose Exam: Normal external nose. Septum midline. Nasal mucosa normal.  Inferior  turbinates normal.    OroPharynx Exam: Dental hygiene adequate. Normal tongue. Normal buccal mucosa. Normal palate.  Normal pharynx. Normal tonsils.    Neck Exam: Supple, no masses or nodes. Trachea and larynx midline.    Thyroid Exam: No tenderness, nodules or enlargement.    Salivary Glands: nontender without masses    Neuro: Alert and oriented times 3, CN VII and XII grossly intact, no nystagmus, PERRL, EOMI, normal speech and gait    Chest/Respiratory Exam: Normal chest wall motion and respiratory effort. No audible stridor or wheezing.    Cardiovascular Exam: No cyanosis, clubbing or edema.    Pulses: carotid pulses normal    ASSESSMENT:  1. ASNHL (asymmetrical sensorineural hearing loss)    2. Tinnitus, right        PLAN: Findings, assessment, and management options were discussed. Discussed that her tinnitus is likely related to the mild hearing loss she has in the right ear.  Discussed pathophysiology, masking techniques and triggers for tinnitus.  We discussed current guidelines in regards to approach to tinnitus.  Unfortunately there are not many satisfying answers but working on triggers may be helpful.  Discussed MRI given unilateral tinnitus on the right and ASNHL on the left.  Will call with results.

## 2021-06-22 NOTE — PROGRESS NOTES
Subjective: This patient comes in for evaluation she had some chest wall pain back on 10/2018 also ringing in the ear.  She went on to see ENT it was unilateral so they felt she should get an MR of the brain this was ordered but she did not get it done.  She does not feel like that important to do at this time.    Patient previously had a CT scan in February 2018 which showed some scarring at the left apex of the lung otherwise unremarkable.    Patient had a mammogram which was negative in December 2017    Denies any pain through the breast it in through the upper chest along the costochondral junction and also can get some discomfort in the left upper back.    I did get a chest x-ray again today and this showed just some scarring as noted before.  Otherwise on    She has no shortness of breath.  The pain is not related to activity.  She does have some tenderness to palpation    Tobacco status: She  reports that  has never smoked. she has never used smokeless tobacco.    Patient Active Problem List    Diagnosis Date Noted     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     Vitamin D deficiency 08/02/2016     Health care maintenance 01/28/2016     Pyelonephritis, unspecified 01/07/2015     Chest Pain      Hearing Loss      Peripheral Neuropathy      Arthralgia      Abnormal Pap Smear Of Cervix        Current Outpatient Medications   Medication Sig Dispense Refill     clonazePAM (KLONOPIN) 0.5 MG tablet Take 0.5 mg by mouth daily.       escitalopram oxalate (LEXAPRO) 20 MG tablet Take 20 mg by mouth daily.       indomethacin (INDOCIN) 25 MG capsule 1 PO Q 8-12 HOURS AS NEEDED FOR CHEST WALL PAIN 30 capsule 1     cholecalciferol, vitamin D3, 2,000 unit cap Take 2,000 Units by mouth daily. 30 each 6     cyclobenzaprine (FLEXERIL) 5 MG tablet 1 po two times a day as needed for chest pain 30 tablet 1     No current facility-administered medications for this visit.        ROS:   10 point review of systems positive as outlined  otherwise negative    Objective:    /70 (Patient Site: Left Arm, Patient Position: Sitting, Cuff Size: Adult Regular)   Pulse 74   Resp 12   Wt 128 lb (58.1 kg)   LMP 03/13/2015   BMI 26.75 kg/m    Body mass index is 26.75 kg/m .      General appearance no acute distress    HEENT oropharynx is clear neck without adenopathy    Lungs were clear throughout no rales or rhonchi heart was regular S1-S2 rate in 70s no murmur    No skin changes    She has pain in the costochondral junction in the upper left chest she says sometimes she will feel it through the left back she had no pain on palpation in through the back    Chest x-ray shows normal heart size lungs are clear throughout other than some scarring in the left apex as before        Assessment:  1. Chest wall pain  XR Chest 2 Views    cyclobenzaprine (FLEXERIL) 5 MG tablet     Chest wall pain did not feel that indomethacin was helpful    Will use some muscle relaxant as needed 5 mg of Flexeril twice daily as needed    Reassurance was given.    We discussed possibly some physical therapy she wanted to hold off on that.        Plan: Follow-up if needed    I reviewed the ENT notes and feeling that most likely the tinnitus is from decreased hearing.  I offered patient getting an MRI scheduled she did not want to do that at this time but will think about it.    This transcription uses voice recognition software, which may contain typographical errors.

## 2021-07-03 NOTE — ADDENDUM NOTE
Addendum Note by Cory Conde MD at 2/3/2018  1:43 PM     Author: Cory Conde MD Service: -- Author Type: Physician    Filed: 2/3/2018  1:43 PM Encounter Date: 2/2/2018 Status: Signed    : Cory Conde MD (Physician)    Addended by: CORY CONDE on: 2/3/2018 01:43 PM        Modules accepted: Orders

## 2021-07-03 NOTE — ADDENDUM NOTE
Addendum Note by Manisha Avery MD at 2/9/2018  5:27 PM     Author: Manisha Avery MD Service: -- Author Type: Physician    Filed: 2/9/2018  5:27 PM Encounter Date: 2/9/2018 Status: Signed    : Manisha Avery MD (Physician)    Addended by: MANISHA AVERY on: 2/9/2018 05:27 PM        Modules accepted: Orders

## 2021-12-20 ENCOUNTER — MEDICAL CORRESPONDENCE (OUTPATIENT)
Dept: HEALTH INFORMATION MANAGEMENT | Facility: CLINIC | Age: 56
End: 2021-12-20

## 2022-02-02 ENCOUNTER — TRANSCRIBE ORDERS (OUTPATIENT)
Dept: OTHER | Age: 57
End: 2022-02-02
Payer: COMMERCIAL

## 2022-02-02 DIAGNOSIS — N64.9 DISORDER OF BREAST, UNSPECIFIED: Primary | ICD-10-CM

## 2022-02-04 NOTE — PROGRESS NOTES
History:  This is a 56 year old female who I'm asked to see by Dr. Castillo for evaluation of a left breast pain.  History is obtained with the assistance of the patient's daughter (Alexandra) acting as a [facility] [place] .  Apparently the patient has had left sided chest wall and perhaps breast pain since her first child was born 36 years ago.  That baby sneezed on the breast and ever since that time she was unable to breast-feed from the left breast.  She was able to breast-feed on the right side.  All of her subsequent children she was unable to breast-feed on the left side.  She feels like the left breast has been lumpy ever since her last child was born 20 years ago.  The lumps are intermittent.  She denies having any nipple discharge.  She denies any skin changes.  She overall has intermittent pain that starts in the left upper inner arm and it seems to radiate to the breast.  It is worse with heavy lifting.  The pain will last for a few weeks at a time.  At home she will take Tylenol and this seems to help with the discomfort.    Past medical history:  Depression  DM  Gout    Past surgical history:  Denies    Medications:     escitalopram oxalate (LEXAPRO) 20 MG tablet, [ESCITALOPRAM OXALATE (LEXAPRO) 20 MG TABLET] Take 20 mg by mouth daily. (Patient not taking: Reported on 2/7/2022), Disp: , Rfl:      hydrOXYzine pamoate (VISTARIL) 25 MG capsule, [HYDROXYZINE PAMOATE (VISTARIL) 25 MG CAPSULE] Take 25 mg by mouth 3 (three) times a day as needed for itching. (Patient not taking: Reported on 2/7/2022), Disp: , Rfl:      indomethacin (INDOCIN) 25 MG capsule, [INDOMETHACIN (INDOCIN) 25 MG CAPSULE] 1 PO Q 8-12 HOURS AS NEEDED FOR CHEST WALL PAIN, Disp: 30 capsule, Rfl: 1     metFORMIN (GLUCOPHAGE-XR) 500 MG 24 hr tablet, metformin  mg tablet,extended release 24 hr  Take 1 tablet twice a day by oral route., Disp: , Rfl:     Allergies:  No Known Allergies    Social history:  Reports that she has never smoked. She  has never used smokeless tobacco. She reports that she does not drink alcohol and does not use drugs.    Family history:  She has no family history of breast cancer or any other type of cancer.    Review of systems:  General: No complaints or constitutional symptoms  Skin: No complaints or symptoms   Hematologic/Lymphatic: No symptoms or complaints  Psychiatric: No symptoms or complaints  Endocrine: No excessive fatigue, no hypermetabolic symptoms reported  Respiratory: No cough, shortness of breath, or wheezing  Cardiovascular: No chest pain or dyspnea on exertion  Breast: Pain with/after movement  Gastrointestinal: No abdominal pain, nausea, diarrhea, or constipation  Musculoskeletal: No recent injuries reported  Neurological: No focal neurologic defects reported.      Exam:  Breastfeeding No   General: Alert, cooperative, appears stated age   Skin: Skin color, texture, turgor normal, no rashes or lesions   Lymphatic: No obvious adenopathy, no swelling   Eyes: No scleral icterus, pupils equal  HENT: No traumatic injury to the head or face, no gross abnormalities  Lungs: Normal respiratory effort, breath sounds equal bilaterally  Heart: Regular rate and rhythm  Breasts: Left breast smaller than the right.  The right nipple sticks out more compared to the left.  No palpable masses or suspicious skin changes bilaterally.  No nipple discharge could be elicited.  Abdomen: Soft, non-distended and non-tender to palpation  Neurologic: Grossly intact    Imaging:  Pertinent images personally reviewed by myself and discussed with the patient.  Radiology reports:  EXAM: DIAGNOSTIC BILATERAL DIGITAL TOMOSYNTHESIS MAMMOGRAPHY WITH CAD AND FOCUSED LEFT BREAST ULTRASOUND EXAM.  CLINICAL INFORMATION: Left breast pain x10 years, most recent following left sided flu shot.  COMPARISON: 12/11/2017 and 7/27/2016.  TECHNIQUE:  - Diagnostic Mammography: 3-D CC and MLO views of both breasts were obtained. CAD was applied.  - Breast  Ultrasound was performed using high-resolution ultrasound transducer. Study was focused towards the area of clinical/ mammographic abnormality only.  Total Lifetime Breast Cancer Risk assessment (TLR): 7.02%, Low Risk Category (<10%) based on information provided by the patient and mammographic breast density utilizing recognized breast cancer risk assessment model. National average TLR =12.5%.  Mammogram: Scattered breast parenchyma, category B. No mass, architectural distortion or suspicious calcification involving either breast.  Ultrasound: Ultrasound evaluation left breast demonstrates uniform appearance of the glandular parenchyma. No solid or cystic lesion.  CONCLUSION:  1.No mammogram or left breast ultrasound evidence of malignancy.  BI-RADS: 1. Negative.    RECOMMENDATION: Annual 3-D screening mammography.    My interpretation:  Normal appearing mammogram and US images    Pathology:  None    IMPRESSION:  Left breast pain.       - I am suspecting this is extramammary/musculoskeletal in nature    PLAN:   Reviewed her imaging and discussed the results.  Both the mammogram and US were normal.  There is no indication for surgical management or further work-up without specific breast pathology.  Discussed the pathophysiology of mastalgia, such as stretching of Alli's ligaments due to large pendulous breasts, hormone replacement therapy, mastitis, cancer, extramammary pain, etc.  Certain medications have been known to cause breast pain such as estrogen, progesterone, antidepressants (SSRIs), antipsychotics, anabolic steroids, and some diuretics.  She does not have any suspicious physical findings, such as a palpable mass or nipple discharge.  First-line management includes good breast support, OTC analgesics, stopping her selective serotonin reuptake inhibitor (which she's apparently not taking already), reduction in caffeine, and warm or cold compresses.  Reassurance was provided.  She will try these  conservative measures.  Since this is likely extramammary in nature, she will continue working with her PCP.  She may benefit from structural evaluation by chiropractor.      I recommend yearly mammography for breast cancer surveillance.    Return to the breast clinic as needed.    Camila Jackson DO  General Surgeon  [facility]  [address] [place] [id number]  Office: [phone]  Employed by - [employer]

## 2022-02-07 ENCOUNTER — OFFICE VISIT (OUTPATIENT)
Dept: SURGERY | Facility: CLINIC | Age: 57
End: 2022-02-07
Attending: FAMILY MEDICINE
Payer: COMMERCIAL

## 2022-02-07 DIAGNOSIS — N64.9 DISORDER OF BREAST, UNSPECIFIED: ICD-10-CM

## 2022-02-07 PROCEDURE — G0463 HOSPITAL OUTPT CLINIC VISIT: HCPCS

## 2022-02-07 PROCEDURE — 99203 OFFICE O/P NEW LOW 30 MIN: CPT | Performed by: SURGERY

## 2022-02-07 RX ORDER — METFORMIN HCL 500 MG
TABLET, EXTENDED RELEASE 24 HR ORAL
COMMUNITY
End: 2022-05-31

## 2022-03-14 NOTE — PROGRESS NOTES
Assessment/Plan:    Establishing care with new doctor, encounter for  Establishing care today.  All past medical history reviewed and updated in EMR.    ASCUS with positive high risk HPV cervical  History of abnormal Pap smear 2016, patient and family report that she saw a specialist for this and is now good, FATIMAH signed today.    Depressive disorder  Depressive disorder listed on chart, medication list includes Lexapro and Invega which looks to be used for schizophrenia typically, patient has seen psychiatrist in the past (most recently 2 months ago) but feels that they have not been helpful and is interested in seeing someone new, referral placed today for psychiatry and we discussed that she could work with them until symptoms are stabilized and then return to me for further care.  - Adult Mental Health  Referral    Type 2 diabetes mellitus without complication, without long-term current use of insulin (H)  Patient and family report history of diabetes, currently taking Metformin, reports blood sugars are elevated at home, will check A1c.  - Hemoglobin A1c    Thyroid nodule  Thyroid nodule seen on recent neck CT scan, will evaluate further with ultrasound and blood work.  - TSH with free T4 reflex  - US Thyroid    Lymphadenopathy, submandibular  Left submandibular lymph node asymmetric compared to right side, 1 cm in size, will check CBC and monitor clinically; consider ultrasound and possible fine-needle aspiration if indicated.  - CBC with platelets    Lipid screening  Will check cholesterol levels today.  - Lipid panel reflex to direct LDL Non-fasting    Encounter for screening for HIV  Due for one-time HIV screen.  - HIV Antigen Antibody Combo    Need for hepatitis C screening test  Due for one-time hepatitis C screen.  - Hepatitis C antibody    Colon cancer screening  Due for colon cancer screening, patient prefers colonoscopy which was ordered today.  - Adult Gastro Ref - Procedure Only          Follow up: pending test results    Allison Eller MD  [facility]    Subjective:   Dedra Montero is a 56 year old female is here today for establish care    3/8/22 urgency room - CT neck showed 1.4 cm L thyroid nodule and L submandibular enlarged lymph node (favors benign)   -neck pain on the front and moves to L side, head, shoulders at that time - pt reports continued pain, notices when swallows, pain comes/goes but always feels when swallowing - no difficulty swallowing, things don't get stuck in the throat  -pain started a few months ago      Medications   -takes DM medication every day - checking blood sugars  -not taking depression medications - hasn't seen doctor for a long time but then was able to see him 2 months ago - started to see in 2017 - hasn't noticed benefit  - sometimes still notices symptoms - not taking medication    Specialists:  OB for abnl pap - just a few visits  Breast specialist - just a few visits  Dr Koehler for psychiatry    Answers for HPI/ROS submitted by the patient on 3/15/2022  Frequency of checking blood sugars:: two times daily  What time of day are you checking your blood sugars : before meals, after meals  Have you had any blood sugars above 200?: Yes  Have you had any blood sugars below 70?: No  Hypoglycemia symptoms:: none  Diabetic concerns:: none  Paraesthesia present:: weight loss  Headache Symptoms are: worsened  How often are you getting headaches or migraines? : Depends what I eat  Are you able to do normal daily activities when you have a migraine?: Yes  Migraine Rescue/Relief Medications:: Tylenol  Effectiveness of rescue/relief medications:: I get some relief  Migraine Preventative Medications:: other  ER or UC Visits:: 0 times  Since last visit, patient describes the following symptoms:: Depression, Weight loss  Your back pain is: chronic  Where is your back pain located? : left middle of back, right upper back, left side of neck, left shoulder  How  would you describe your back pain? : shooting, stabbing  Where does your back pain spread? : left shoulder  Since you noticed your back pain, how has it changed? : always present, but gets better and worse  Does your back pain interfere with your job?: Yes  Weight loss: : 5 lbs.  How many servings of fruits and vegetables do you eat daily?: 2-3  On average, how many sweetened beverages do you drink each day (Examples: soda, juice, sweet tea, etc.  Do NOT count diet or artificially sweetened beverages)?: 0  How many minutes a day do you exercise enough to make your heart beat faster?: 20 to 29  How many days a week do you exercise enough to make your heart beat faster?: 7  How many days per week do you miss taking your medication?: 1      Patient Active Problem List   Diagnosis     Peripheral Neuropathy     ASCUS with positive high risk HPV cervical     Chest Pain     Hearing loss     Hx of pyelonephritis     Vitamin D deficiency     Apical lung scarring     Type 2 diabetes mellitus (H)     Depressive disorder     History reviewed. No pertinent past medical history.  Past Surgical History:   Procedure Laterality Date     NO PAST SURGERIES       Current Outpatient Medications   Medication     capsaicin (ZOSTRIX) 0.025 % external cream     indomethacin (INDOCIN) 25 MG capsule     metFORMIN (GLUCOPHAGE-XR) 500 MG 24 hr tablet     naproxen (NAPROSYN) 500 MG tablet     No current facility-administered medications for this visit.     No Known Allergies  Social History     Socioeconomic History     Marital status:      Spouse name: Not on file     Number of children: Not on file     Years of education: Not on file     Highest education level: Not on file   Occupational History     Not on file   Tobacco Use     Smoking status: Never Smoker     Smokeless tobacco: Never Used   Substance and Sexual Activity     Alcohol use: No     Drug use: No     Sexual activity: Yes     Partners: Male     Birth control/protection:  Post-menopausal   Other Topics Concern     Not on file   Social History Narrative    The patient lives with  and family.  Born in [facility], in [facility] x 13 years, to [place] 1990     Social Determinants of Health     Financial Resource Strain: Not on file   Food Insecurity: Not on file   Transportation Needs: Not on file   Physical Activity: Not on file   Stress: Not on file   Social Connections: Not on file   Intimate Partner Violence: Not on file   Housing Stability: Not on file     Family History   Problem Relation Age of Onset     Diabetes Mother      Hearing Loss Mother      No Known Problems Father      No Known Problems Sister      No Known Problems Sister      No Known Problems Sister      No Known Problems Sister      No Known Problems Brother      No Known Problems Brother      No Known Problems Brother      Review of systems is as stated in HPI, and the remainder of system review is otherwise negative.    Objective:     /60 (BP Location: Right arm, Patient Position: Sitting, Cuff Size: Adult Regular)   Wt 54.9 kg (121 lb)   BMI 24.44 kg/m      General appearance: awake, NAD  HEENT: atraumatic, normocephalic, no scleral icterus or injection  Lungs: breathing comfortably on room air  Extremities: moving all extremities  Neuro: alert, oriented x3, CNs grossly intact, no focal deficits appreciated  Psych: normal mood/affect/behavior, answering questions appropriately, linear thought process

## 2022-03-15 ENCOUNTER — OFFICE VISIT (OUTPATIENT)
Dept: FAMILY MEDICINE | Facility: CLINIC | Age: 57
End: 2022-03-15
Payer: COMMERCIAL

## 2022-03-15 VITALS — DIASTOLIC BLOOD PRESSURE: 60 MMHG | BODY MASS INDEX: 24.44 KG/M2 | SYSTOLIC BLOOD PRESSURE: 100 MMHG | WEIGHT: 121 LBS

## 2022-03-15 DIAGNOSIS — F32.A DEPRESSIVE DISORDER: ICD-10-CM

## 2022-03-15 DIAGNOSIS — R87.810 ASCUS WITH POSITIVE HIGH RISK HPV CERVICAL: ICD-10-CM

## 2022-03-15 DIAGNOSIS — Z76.89 ESTABLISHING CARE WITH NEW DOCTOR, ENCOUNTER FOR: Primary | ICD-10-CM

## 2022-03-15 DIAGNOSIS — Z11.59 NEED FOR HEPATITIS C SCREENING TEST: ICD-10-CM

## 2022-03-15 DIAGNOSIS — Z12.11 COLON CANCER SCREENING: ICD-10-CM

## 2022-03-15 DIAGNOSIS — R59.0 LYMPHADENOPATHY, SUBMANDIBULAR: ICD-10-CM

## 2022-03-15 DIAGNOSIS — R87.610 ASCUS WITH POSITIVE HIGH RISK HPV CERVICAL: ICD-10-CM

## 2022-03-15 DIAGNOSIS — E04.1 THYROID NODULE: ICD-10-CM

## 2022-03-15 DIAGNOSIS — Z13.220 LIPID SCREENING: ICD-10-CM

## 2022-03-15 DIAGNOSIS — Z11.4 ENCOUNTER FOR SCREENING FOR HIV: ICD-10-CM

## 2022-03-15 DIAGNOSIS — E11.9 TYPE 2 DIABETES MELLITUS WITHOUT COMPLICATION, WITHOUT LONG-TERM CURRENT USE OF INSULIN (H): ICD-10-CM

## 2022-03-15 LAB
CHOLEST SERPL-MCNC: 195 MG/DL
ERYTHROCYTE [DISTWIDTH] IN BLOOD BY AUTOMATED COUNT: 11.9 % (ref 10–15)
FASTING STATUS PATIENT QL REPORTED: NO
HBA1C MFR BLD: 6.6 % (ref 0–5.6)
HCT VFR BLD AUTO: 38.1 % (ref 35–47)
HDLC SERPL-MCNC: 55 MG/DL
HGB BLD-MCNC: 12.4 G/DL (ref 11.7–15.7)
HIV 1+2 AB+HIV1 P24 AG SERPL QL IA: NEGATIVE
LDLC SERPL CALC-MCNC: 122 MG/DL
MCH RBC QN AUTO: 28.8 PG (ref 26.5–33)
MCHC RBC AUTO-ENTMCNC: 32.5 G/DL (ref 31.5–36.5)
MCV RBC AUTO: 88 FL (ref 78–100)
PLATELET # BLD AUTO: 236 10E3/UL (ref 150–450)
RBC # BLD AUTO: 4.31 10E6/UL (ref 3.8–5.2)
TRIGL SERPL-MCNC: 92 MG/DL
TSH SERPL DL<=0.005 MIU/L-ACNC: 1.25 UIU/ML (ref 0.3–5)
WBC # BLD AUTO: 4.5 10E3/UL (ref 4–11)

## 2022-03-15 PROCEDURE — 83036 HEMOGLOBIN GLYCOSYLATED A1C: CPT | Performed by: FAMILY MEDICINE

## 2022-03-15 PROCEDURE — 36415 COLL VENOUS BLD VENIPUNCTURE: CPT | Performed by: FAMILY MEDICINE

## 2022-03-15 PROCEDURE — 80061 LIPID PANEL: CPT | Performed by: FAMILY MEDICINE

## 2022-03-15 PROCEDURE — 84443 ASSAY THYROID STIM HORMONE: CPT | Performed by: FAMILY MEDICINE

## 2022-03-15 PROCEDURE — 86803 HEPATITIS C AB TEST: CPT | Performed by: FAMILY MEDICINE

## 2022-03-15 PROCEDURE — 85027 COMPLETE CBC AUTOMATED: CPT | Performed by: FAMILY MEDICINE

## 2022-03-15 PROCEDURE — 87389 HIV-1 AG W/HIV-1&-2 AB AG IA: CPT | Performed by: FAMILY MEDICINE

## 2022-03-15 PROCEDURE — 99214 OFFICE O/P EST MOD 30 MIN: CPT | Performed by: FAMILY MEDICINE

## 2022-03-15 RX ORDER — BENZTROPINE MESYLATE 1 MG/1
TABLET ORAL
COMMUNITY
Start: 2022-03-11 | End: 2022-03-15

## 2022-03-15 RX ORDER — NAPROXEN 500 MG/1
TABLET ORAL
COMMUNITY
Start: 2021-12-20 | End: 2022-07-12

## 2022-03-15 RX ORDER — CAPSAICIN 0.025 %
CREAM (GRAM) TOPICAL
COMMUNITY
Start: 2021-11-19 | End: 2022-05-31

## 2022-03-15 RX ORDER — PALIPERIDONE 6 MG/1
TABLET, EXTENDED RELEASE ORAL
COMMUNITY
Start: 2022-03-11 | End: 2022-03-15

## 2022-03-16 LAB — HCV AB SERPL QL IA: NEGATIVE

## 2022-03-17 ENCOUNTER — HOSPITAL ENCOUNTER (OUTPATIENT)
Dept: ULTRASOUND IMAGING | Facility: CLINIC | Age: 57
Discharge: HOME | End: 2022-03-17
Attending: FAMILY MEDICINE | Admitting: FAMILY MEDICINE
Payer: COMMERCIAL

## 2022-03-17 DIAGNOSIS — E04.1 THYROID NODULE: ICD-10-CM

## 2022-03-17 PROCEDURE — 76536 US EXAM OF HEAD AND NECK: CPT

## 2022-03-27 ENCOUNTER — HEALTH MAINTENANCE LETTER (OUTPATIENT)
Age: 57
End: 2022-03-27

## 2022-05-30 NOTE — PROGRESS NOTES
Assessment/Plan:    Epigastric pain  Elevated transaminase level  Has been having issues with epigastric abd pain for months but worsened recently and was seen in urgent care - symptoms improving with PPI, though not completely resolved. LFTs were elevated at urgent care, will recheck today - if still elevated would recommend RUQ US. Recommend checking H pylori stool test and treating with quad therapy if positive. Will consider EGD if symptoms do not resolve.  - Comprehensive metabolic panel (BMP + Alb, Alk Phos, ALT, AST, Total. Bili, TP)  - Helicobacter pylori Antigen Stool    Type 2 diabetes mellitus without complication, without long-term current use of insulin (H)  Hx DM2, last A1c 6.6%. Pt requests refill of metformin which was provided. Due for urine microalbumin.   - Albumin Random Urine Quantitative with Creat Ratio  - metFORMIN (GLUCOPHAGE XR) 500 MG 24 hr tablet  Dispense: 180 tablet; Refill: 1    Screen for colon cancer  Due for colon cancer screening, ordered today.  - Adult Gastro Ref - Procedure Only    Encounter for vaccination  COVID booster administered today.  - COVID-19,PF,MODERNA (18+ YRS BOOSTER .25ML)         Follow up: for physical with pap    Allison Eller MD  [facility]    Subjective:   Dedra Montero is a 56 year old female is here today for abd pain    -seen on 5/11 at urgent care - abd pain - rec PPI x2 weeks - CT nml, BMP/CBC nml, AST/ALT elev  -was/is upper stomach pain  -had an episode last year after eating raspberries in Oct  -does feel nausea, no vomiting  -stools are more hard/constipated - no blood  -normal urination  -taking omeprazole? Yes, did help with sx but not fully resolved - if was a 10/10 now is a 2-3/10  -NSAID use? Not currently      Patient Active Problem List   Diagnosis     Peripheral Neuropathy     ASCUS with positive high risk HPV cervical     Chest Pain     Hearing loss     Hx of pyelonephritis     Vitamin D deficiency     Apical lung scarring      "Type 2 diabetes mellitus (H)     Depressive disorder     History reviewed. No pertinent past medical history.  Past Surgical History:   Procedure Laterality Date     NO PAST SURGERIES       Current Outpatient Medications   Medication     metFORMIN (GLUCOPHAGE XR) 500 MG 24 hr tablet     naproxen (NAPROSYN) 500 MG tablet     omeprazole (PRILOSEC) 20 MG DR capsule     No current facility-administered medications for this visit.     No Known Allergies  Social History     Socioeconomic History     Marital status:      Spouse name: Not on file     Number of children: Not on file     Years of education: Not on file     Highest education level: Not on file   Occupational History     Not on file   Tobacco Use     Smoking status: Never Smoker     Smokeless tobacco: Never Used   Substance and Sexual Activity     Alcohol use: No     Drug use: No     Sexual activity: Yes     Partners: Male     Birth control/protection: Post-menopausal   Other Topics Concern     Not on file   Social History Narrative    The patient lives with  and family.  Born in [facility], [place] [facility] [place] x 13 years, to  1990     Social Determinants of Health     Financial Resource Strain: Not on file   Food Insecurity: Not on file   Transportation Needs: Not on file   Physical Activity: Not on file   Stress: Not on file   Social Connections: Not on file   Intimate Partner Violence: Not on file   Housing Stability: Not on file     Family History   Problem Relation Age of Onset     Diabetes Mother      Hearing Loss Mother      No Known Problems Father      No Known Problems Sister      No Known Problems Sister      No Known Problems Sister      No Known Problems Sister      No Known Problems Brother      No Known Problems Brother      No Known Problems Brother      Review of systems is as stated in HPI, and the remainder of system review is otherwise negative.    Objective:     /80   Pulse 59   Ht 1.499 m (4' 11\")   Wt 55.5 kg (122 lb 5 oz)   " BMI 24.70 kg/m      General appearance: awake, NAD, here with daughter  HEENT: atraumatic, normocephalic, no scleral icterus or injection  CV: RRR, no murmurs/rubs/gallops, normal S1 and S2  Lungs: CTAB, no wheezes or crackles, breathing comfortably on room air  Abd: active bowel sounds, soft, non-distended, non-tender  Extremities: no LE edema bilaterally, moving all extremities  Skin: no rashes or lesions  Neuro: alert, CNs grossly intact, no focal deficits appreciated  Psych: normal mood/affect/behavior, answering questions appropriately via daughter who was interpreting  Answers for HPI/ROS submitted by the patient on 5/31/2022  If you checked off any problems, how difficult have these problems made it for you to do your work, take care of things at home, or get along with other people?: Somewhat difficult  PHQ9 TOTAL SCORE: 2  Do you check your blood pressure regularly outside of the clinic?: Yes  Are your blood pressures ever more than 140 on the top number (systolic) OR more than 90 on the bottom number (diastolic)? (For example, greater than 140/90): No  Are you following a low salt diet?: No

## 2022-05-31 ENCOUNTER — OFFICE VISIT (OUTPATIENT)
Dept: FAMILY MEDICINE | Facility: CLINIC | Age: 57
End: 2022-05-31
Payer: COMMERCIAL

## 2022-05-31 VITALS
HEART RATE: 59 BPM | WEIGHT: 122.31 LBS | SYSTOLIC BLOOD PRESSURE: 120 MMHG | HEIGHT: 59 IN | BODY MASS INDEX: 24.66 KG/M2 | DIASTOLIC BLOOD PRESSURE: 80 MMHG

## 2022-05-31 DIAGNOSIS — A04.8 H. PYLORI INFECTION: ICD-10-CM

## 2022-05-31 DIAGNOSIS — R74.01 ELEVATED TRANSAMINASE LEVEL: ICD-10-CM

## 2022-05-31 DIAGNOSIS — Z12.11 SCREEN FOR COLON CANCER: ICD-10-CM

## 2022-05-31 DIAGNOSIS — E11.9 TYPE 2 DIABETES MELLITUS WITHOUT COMPLICATION, WITHOUT LONG-TERM CURRENT USE OF INSULIN (H): ICD-10-CM

## 2022-05-31 DIAGNOSIS — Z23 ENCOUNTER FOR VACCINATION: ICD-10-CM

## 2022-05-31 DIAGNOSIS — R10.13 EPIGASTRIC PAIN: Primary | ICD-10-CM

## 2022-05-31 LAB
ALBUMIN SERPL-MCNC: 4 G/DL (ref 3.5–5)
ALP SERPL-CCNC: 111 U/L (ref 45–120)
ALT SERPL W P-5'-P-CCNC: 103 U/L (ref 0–45)
ANION GAP SERPL CALCULATED.3IONS-SCNC: 13 MMOL/L (ref 5–18)
AST SERPL W P-5'-P-CCNC: 36 U/L (ref 0–40)
BILIRUB SERPL-MCNC: 0.5 MG/DL (ref 0–1)
BUN SERPL-MCNC: 18 MG/DL (ref 8–22)
CALCIUM SERPL-MCNC: 9.8 MG/DL (ref 8.5–10.5)
CHLORIDE BLD-SCNC: 103 MMOL/L (ref 98–107)
CO2 SERPL-SCNC: 25 MMOL/L (ref 22–31)
CREAT SERPL-MCNC: 0.65 MG/DL (ref 0.6–1.1)
CREAT UR-MCNC: 24 MG/DL
GFR SERPL CREATININE-BSD FRML MDRD: >90 ML/MIN/1.73M2
GLUCOSE BLD-MCNC: 132 MG/DL (ref 70–125)
MICROALBUMIN UR-MCNC: <0.5 MG/DL (ref 0–1.99)
MICROALBUMIN/CREAT UR: NORMAL MG/G{CREAT}
POTASSIUM BLD-SCNC: 3.9 MMOL/L (ref 3.5–5)
PROT SERPL-MCNC: 7.5 G/DL (ref 6–8)
SODIUM SERPL-SCNC: 141 MMOL/L (ref 136–145)

## 2022-05-31 PROCEDURE — 91306 COVID-19,PF,MODERNA (18+ YRS BOOSTER .25ML): CPT | Performed by: FAMILY MEDICINE

## 2022-05-31 PROCEDURE — 87338 HPYLORI STOOL AG IA: CPT | Performed by: FAMILY MEDICINE

## 2022-05-31 PROCEDURE — 80053 COMPREHEN METABOLIC PANEL: CPT | Performed by: FAMILY MEDICINE

## 2022-05-31 PROCEDURE — 99214 OFFICE O/P EST MOD 30 MIN: CPT | Mod: 25 | Performed by: FAMILY MEDICINE

## 2022-05-31 PROCEDURE — 36415 COLL VENOUS BLD VENIPUNCTURE: CPT | Performed by: FAMILY MEDICINE

## 2022-05-31 PROCEDURE — 0064A COVID-19,PF,MODERNA (18+ YRS BOOSTER .25ML): CPT | Performed by: FAMILY MEDICINE

## 2022-05-31 PROCEDURE — 82043 UR ALBUMIN QUANTITATIVE: CPT | Performed by: FAMILY MEDICINE

## 2022-05-31 RX ORDER — METFORMIN HCL 500 MG
500 TABLET, EXTENDED RELEASE 24 HR ORAL 2 TIMES DAILY WITH MEALS
Qty: 180 TABLET | Refills: 1 | Status: SHIPPED | OUTPATIENT
Start: 2022-05-31 | End: 2022-11-02

## 2022-05-31 ASSESSMENT — PATIENT HEALTH QUESTIONNAIRE - PHQ9
SUM OF ALL RESPONSES TO PHQ QUESTIONS 1-9: 2
10. IF YOU CHECKED OFF ANY PROBLEMS, HOW DIFFICULT HAVE THESE PROBLEMS MADE IT FOR YOU TO DO YOUR WORK, TAKE CARE OF THINGS AT HOME, OR GET ALONG WITH OTHER PEOPLE: SOMEWHAT DIFFICULT
SUM OF ALL RESPONSES TO PHQ QUESTIONS 1-9: 2

## 2022-05-31 NOTE — PATIENT INSTRUCTIONS
Recommend rechecking liver blood tests today - if still elevated then would order a liver ultrasound    We will check a stool test for H pylori bacteria

## 2022-06-02 LAB — H PYLORI AG STL QL IA: POSITIVE

## 2022-06-03 RX ORDER — METRONIDAZOLE 500 MG/1
500 TABLET ORAL 4 TIMES DAILY
Qty: 56 TABLET | Refills: 0 | Status: SHIPPED | OUTPATIENT
Start: 2022-06-03 | End: 2022-06-17

## 2022-06-03 RX ORDER — TETRACYCLINE HYDROCHLORIDE 500 MG/1
500 CAPSULE ORAL 4 TIMES DAILY
Qty: 56 CAPSULE | Refills: 0 | Status: SHIPPED | OUTPATIENT
Start: 2022-06-03 | End: 2022-06-17

## 2022-06-03 RX ORDER — BISMUTH SUBSALICYLATE 262 MG/1
2 TABLET, CHEWABLE ORAL 4 TIMES DAILY
Qty: 112 TABLET | Refills: 0 | Status: SHIPPED | OUTPATIENT
Start: 2022-06-03 | End: 2022-06-17

## 2022-06-06 ENCOUNTER — MYC MEDICAL ADVICE (OUTPATIENT)
Dept: FAMILY MEDICINE | Facility: CLINIC | Age: 57
End: 2022-06-06
Payer: COMMERCIAL

## 2022-06-06 NOTE — TELEPHONE ENCOUNTER
Chief Complaint   Patient presents with     Nausea     Advised to monitor and to schedule a follow up visit if any fever, pain, vomiting, bloody stool/ vomiting, or bloating does not go away. Attached information  About Hpylori.  Esther Varela RN on 6/6/2022 at 9:24 AM

## 2022-06-06 NOTE — TELEPHONE ENCOUNTER
Chief Complaint   Patient presents with     Nausea     Patient is positive for Hpylori. Incoming medical message from son . Patient experiencing nausea and bloating.  Esther Varela RN on 6/6/2022 at 9:09 AM

## 2022-06-29 ENCOUNTER — HOSPITAL ENCOUNTER (OUTPATIENT)
Dept: ULTRASOUND IMAGING | Facility: CLINIC | Age: 57
Discharge: HOME | End: 2022-06-29
Attending: FAMILY MEDICINE | Admitting: FAMILY MEDICINE
Payer: COMMERCIAL

## 2022-06-29 DIAGNOSIS — R10.13 EPIGASTRIC PAIN: ICD-10-CM

## 2022-06-29 DIAGNOSIS — R74.01 ELEVATED TRANSAMINASE LEVEL: ICD-10-CM

## 2022-06-29 PROCEDURE — 76705 ECHO EXAM OF ABDOMEN: CPT

## 2022-07-08 NOTE — PROGRESS NOTES
Assessment/Plan:   Dedra is a 57 year old female here for physical     Routine adult health maintenance  Physical completed today. Vaccine as below. Labs up to date - will retest H pylori stool to see if cured. Pap smear completed today, colonoscopy ordered today.  - PNEUMOCOCCAL 20 VALENT CONJUGATE (PREVNAR 20)    Screen for colon cancer  Due for colonoscopy, ordered today.  - Colonscopy Screening  Referral    Cervical cancer screening  Due for pap smear, completed cotesting today - last pap was abnormal with ASCUS with pos HPV.  - Pap screen with HPV - recommended age 30 - 65 years       I have had an Advance Directives discussion with the patient.    Follow up: 1 year for physical, sooner as needed    Allison Eller MD  [facility]      Subjective:     Dedra Montero is a 57 year old female who presents for an annual exam.     Answers for HPI/ROS submitted by the patient on 7/12/2022  Frequency of exercise:: 4-5 days/week  Getting at least 3 servings of Calcium per day:: Yes  Diet:: Low salt, Low fat/cholesterol, Diabetic  Taking medications regularly:: Yes  Medication side effects:: None  Bi-annual eye exam:: Yes  Dental care twice a year:: Yes  Sleep apnea or symptoms of sleep apnea:: None  abdominal pain: No  Blood in stool: No  Blood in urine: No  chest pain: No  chills: No  congestion: No  constipation: No  cough: No  diarrhea: No  dizziness: No  ear pain: No  eye pain: No  nervous/anxious: No  fever: No  frequency: No  genital sores: No  headaches: No  hearing loss: No  heartburn: No  arthralgias: No  joint swelling: No  peripheral edema: No  mood changes: No  myalgias: Yes  nausea: No  dysuria: No  palpitations: No  Skin sensation changes: No  sore throat: No  urgency: No  rash: No  shortness of breath: No  visual disturbance: No  weakness: No  Additional concerns today:: No  Duration of exercise:: 15-30 minutes    H pylori positive - did complete the course, had HA and nausea with this -  sometimes notices stomach issues still, is different than previously - does have heartburn sometimes    Health Maintenance reviewed:  Lipid Profile: up to date  Glucose Screen: up to date  Colonoscopy: due  Mammogram: up to date    Gynecologic History  No LMP recorded. Patient is postmenopausal.  Last Pap: 2016. Results were: ASCUS with high risk HPV    Immunization History   Administered Date(s) Administered     COVID-19,PF,Shira 04/02/2021     COVID-19,PF,Moderna Booster 11/20/2021, 05/31/2022     HepA-Adult 10/27/2014, 06/08/2015     Influenza (IIV3) PF 10/01/2021     Influenza Vaccine IM > 6 months Valent IIV4 (Alfuria,Fluzone) 10/05/2017, 10/09/2018     Influenza Vaccine, 6+MO IM (QUADRIVALENT W/PRESERVATIVES) 11/14/2007, 10/29/2008, 12/11/2009, 09/24/2012, 10/17/2014     Tdap (Adacel,Boostrix) 01/16/2009, 10/01/2014     Immunization status: PCV20 today.    Current Outpatient Medications   Medication Sig Dispense Refill     metFORMIN (GLUCOPHAGE XR) 500 MG 24 hr tablet Take 1 tablet (500 mg) by mouth 2 times daily (with meals) 180 tablet 1     naproxen (NAPROSYN) 500 MG tablet        omeprazole (PRILOSEC) 20 MG DR capsule TAKE 1 PILL BY MOUTH DAILY BEFORE A MEAL FOR 14 DAYS/ TXHUA HNUB NOJ 1 LUB UA NTEJ NOJ MOV TASHI 14 HNUB       History reviewed. No pertinent past medical history.  Past Surgical History:   Procedure Laterality Date     NO PAST SURGERIES       Patient has no known allergies.  Family History   Problem Relation Age of Onset     Diabetes Mother      Hearing Loss Mother      No Known Problems Father      No Known Problems Sister      No Known Problems Sister      No Known Problems Sister      No Known Problems Sister      No Known Problems Brother      No Known Problems Brother      No Known Problems Brother      Social History     Socioeconomic History     Marital status:      Spouse name: Not on file     Number of children: Not on file     Years of education: Not on file     Highest  "education level: Not on file   Occupational History     Not on file   Tobacco Use     Smoking status: Never Smoker     Smokeless tobacco: Never Used   Substance and Sexual Activity     Alcohol use: No     Drug use: No     Sexual activity: Yes     Partners: Male     Birth control/protection: Post-menopausal   Other Topics Concern     Not on file   Social History Narrative    The patient lives with  and family.  Born in [facility], in [facility] x 13 years, to  1990     Social Determinants of Health     Financial Resource Strain: Not on file   Food Insecurity: Not on file   Transportation Needs: Not on file   Physical Activity: Not on file   Stress: Not on file   Social Connections: Not on file   Intimate Partner Violence: Not on file   Housing Stability: Not on file       Review of Systems negative unless noted    Objective:        Vitals:    07/12/22 0927   BP: 110/70   Pulse: 68   Temp: 97.2  F (36.2  C)   Weight: 53.5 kg (118 lb)   Height: 1.473 m (4' 10\")   PainSc: No Pain (0)     Body mass index is 24.66 kg/m .    Physical Exam:  General Appearance: Alert, pleasant, appears stated age  Head: Normocephalic, without obvious abnormality  Eyes: PERRL, conjunctiva/corneas clear, EOM's intact  Ears: Normal TM's and external ear canals, both ears  Neck: Supple,without lymphadenopathy, no thyromegaly or nodules noted  Lungs: Clear to auscultation bilaterally, respirations unlabored, no wheezing or crackles  Heart: Regular rate and rhythm, no murmur   Breast:  Normal appearance.  No palpable masses, nipple discharge, or skin changes  Abdomen: Soft, non-tender, no masses, no organomegaly  Extremities: Extremities with strong and symmetric pulses, no cyanosis or edema  Skin: Skin color, texture normal, no rashes or lesions  Neurologic: Grossly normal, no focal deficits  Pelvic exam: normal external genitalia, normal appearing cervix, no discharge    "

## 2022-07-12 ENCOUNTER — OFFICE VISIT (OUTPATIENT)
Dept: FAMILY MEDICINE | Facility: CLINIC | Age: 57
End: 2022-07-12
Payer: COMMERCIAL

## 2022-07-12 VITALS
SYSTOLIC BLOOD PRESSURE: 110 MMHG | BODY MASS INDEX: 24.77 KG/M2 | HEART RATE: 68 BPM | HEIGHT: 58 IN | WEIGHT: 118 LBS | TEMPERATURE: 97.2 F | DIASTOLIC BLOOD PRESSURE: 70 MMHG

## 2022-07-12 DIAGNOSIS — Z12.11 SCREEN FOR COLON CANCER: ICD-10-CM

## 2022-07-12 DIAGNOSIS — Z12.4 CERVICAL CANCER SCREENING: ICD-10-CM

## 2022-07-12 DIAGNOSIS — Z00.00 ROUTINE ADULT HEALTH MAINTENANCE: Primary | ICD-10-CM

## 2022-07-12 PROCEDURE — 90471 IMMUNIZATION ADMIN: CPT | Performed by: FAMILY MEDICINE

## 2022-07-12 PROCEDURE — 90677 PCV20 VACCINE IM: CPT | Performed by: FAMILY MEDICINE

## 2022-07-12 PROCEDURE — G0145 SCR C/V CYTO,THINLAYER,RESCR: HCPCS | Performed by: FAMILY MEDICINE

## 2022-07-12 PROCEDURE — 99396 PREV VISIT EST AGE 40-64: CPT | Mod: 25 | Performed by: FAMILY MEDICINE

## 2022-07-12 PROCEDURE — 87624 HPV HI-RISK TYP POOLED RSLT: CPT | Performed by: FAMILY MEDICINE

## 2022-07-12 PROCEDURE — G0124 SCREEN C/V THIN LAYER BY MD: HCPCS | Performed by: PATHOLOGY

## 2022-07-12 ASSESSMENT — ENCOUNTER SYMPTOMS
FREQUENCY: 0
CHILLS: 0
WEAKNESS: 0
HEARTBURN: 0
DIARRHEA: 0
MYALGIAS: 1
EYE PAIN: 0
HEMATURIA: 0
SORE THROAT: 0
CONSTIPATION: 0
NAUSEA: 0
FEVER: 0
NERVOUS/ANXIOUS: 0
COUGH: 0
HEMATOCHEZIA: 0
ARTHRALGIAS: 0
DYSURIA: 0
HEADACHES: 0
JOINT SWELLING: 0
PARESTHESIAS: 0
DIZZINESS: 0
PALPITATIONS: 0
SHORTNESS OF BREATH: 0
ABDOMINAL PAIN: 0

## 2022-07-12 ASSESSMENT — PAIN SCALES - GENERAL: PAINLEVEL: NO PAIN (0)

## 2022-07-13 ENCOUNTER — LAB (OUTPATIENT)
Dept: LAB | Facility: CLINIC | Age: 57
End: 2022-07-13
Payer: COMMERCIAL

## 2022-07-13 DIAGNOSIS — A04.8 H. PYLORI INFECTION: ICD-10-CM

## 2022-07-13 PROCEDURE — 87338 HPYLORI STOOL AG IA: CPT

## 2022-07-14 LAB — H PYLORI AG STL QL IA: NEGATIVE

## 2022-07-17 ENCOUNTER — HEALTH MAINTENANCE LETTER (OUTPATIENT)
Age: 57
End: 2022-07-17

## 2022-07-18 LAB
BKR LAB AP GYN ADEQUACY: ABNORMAL
BKR LAB AP GYN INTERPRETATION: ABNORMAL
BKR LAB AP HPV REFLEX: ABNORMAL
BKR LAB AP PREVIOUS ABNL DX: ABNORMAL
BKR LAB AP PREVIOUS ABNORMAL: ABNORMAL
PATH REPORT.COMMENTS IMP SPEC: ABNORMAL
PATH REPORT.COMMENTS IMP SPEC: ABNORMAL
PATH REPORT.RELEVANT HX SPEC: ABNORMAL

## 2022-07-19 LAB
HUMAN PAPILLOMA VIRUS 16 DNA: NEGATIVE
HUMAN PAPILLOMA VIRUS 18 DNA: NEGATIVE
HUMAN PAPILLOMA VIRUS FINAL DIAGNOSIS: ABNORMAL
HUMAN PAPILLOMA VIRUS OTHER HR: POSITIVE

## 2022-07-20 ENCOUNTER — PATIENT OUTREACH (OUTPATIENT)
Dept: FAMILY MEDICINE | Facility: CLINIC | Age: 57
End: 2022-07-20

## 2022-08-11 ENCOUNTER — OFFICE VISIT (OUTPATIENT)
Dept: FAMILY MEDICINE | Facility: CLINIC | Age: 57
End: 2022-08-11
Payer: COMMERCIAL

## 2022-08-11 VITALS
WEIGHT: 118.9 LBS | TEMPERATURE: 98.1 F | SYSTOLIC BLOOD PRESSURE: 110 MMHG | HEART RATE: 57 BPM | BODY MASS INDEX: 24.85 KG/M2 | DIASTOLIC BLOOD PRESSURE: 64 MMHG | OXYGEN SATURATION: 99 %

## 2022-08-11 DIAGNOSIS — B97.7 HIGH RISK HPV INFECTION: ICD-10-CM

## 2022-08-11 DIAGNOSIS — R87.611 PAP SMEAR OF CERVIX WITH ASCUS, CANNOT EXCLUDE HGSIL: ICD-10-CM

## 2022-08-11 DIAGNOSIS — Z98.890 HISTORY OF COLPOSCOPY: Primary | ICD-10-CM

## 2022-08-11 PROCEDURE — 88305 TISSUE EXAM BY PATHOLOGIST: CPT | Performed by: PATHOLOGY

## 2022-08-11 PROCEDURE — 99207 PR DROP WITH A PROCEDURE: CPT | Mod: 25 | Performed by: FAMILY MEDICINE

## 2022-08-11 PROCEDURE — 57454 BX/CURETT OF CERVIX W/SCOPE: CPT | Performed by: FAMILY MEDICINE

## 2022-08-11 RX ORDER — PALIPERIDONE 6 MG/1
TABLET, EXTENDED RELEASE ORAL
COMMUNITY
Start: 2022-07-14

## 2022-08-11 RX ORDER — BENZTROPINE MESYLATE 1 MG/1
TABLET ORAL
COMMUNITY
Start: 2022-07-12

## 2022-08-11 ASSESSMENT — PAIN SCALES - GENERAL: PAINLEVEL: NO PAIN (0)

## 2022-08-12 ENCOUNTER — TELEPHONE (OUTPATIENT)
Dept: FAMILY MEDICINE | Facility: CLINIC | Age: 57
End: 2022-08-12

## 2022-08-12 DIAGNOSIS — R87.611 PAP SMEAR OF CERVIX WITH ASCUS, CANNOT EXCLUDE HGSIL: Primary | ICD-10-CM

## 2022-08-12 DIAGNOSIS — B97.7 HIGH RISK HPV INFECTION: ICD-10-CM

## 2022-08-12 NOTE — TELEPHONE ENCOUNTER
Reason for Call:  Orders    Detailed comments: Received a call from Kellie with [facility] informing Dr. Rich that the specimen labels are flip flopped. Specimen A needs to be labeled as ECC and Specimen C needs to be labeled as 7 oclock cervix biopsy.     Please update the orders so pathology can send them in to be processed.     Please call [phone] ask for kellie if there are additional question     Call taken on 8/12/2022 at 8:13 AM by Kim Parkinson

## 2022-08-12 NOTE — TELEPHONE ENCOUNTER
New surg path order placed, obviously unable to change the labels on the specimens since they are off site    Dr Eller

## 2022-08-16 LAB
PATH REPORT.COMMENTS IMP SPEC: NORMAL
PATH REPORT.FINAL DX SPEC: NORMAL
PATH REPORT.GROSS SPEC: NORMAL
PATH REPORT.MICROSCOPIC SPEC OTHER STN: NORMAL
PATH REPORT.RELEVANT HX SPEC: NORMAL
PHOTO IMAGE: NORMAL

## 2022-08-17 ENCOUNTER — TELEPHONE (OUTPATIENT)
Dept: FAMILY MEDICINE | Facility: CLINIC | Age: 57
End: 2022-08-17

## 2022-08-17 NOTE — TELEPHONE ENCOUNTER
----- Message from Marlyn Rich MD sent at 8/17/2022  1:30 PM CDT -----  Please let patient know that the biopsies from her colposcopy show mild changes associated with HPV infection.  I recommend that she have a follow-up Pap and HPV test in 1 year.

## 2022-09-12 ENCOUNTER — PATIENT OUTREACH (OUTPATIENT)
Dept: FAMILY MEDICINE | Facility: CLINIC | Age: 57
End: 2022-09-12

## 2022-09-12 NOTE — TELEPHONE ENCOUNTER
08/11/22 [place] [facility] WARNER I, ECC single fragment of atypical Epithelium and inflamed stroma. Plan cotest in 1 year due 08/11/23

## 2022-09-25 ENCOUNTER — HEALTH MAINTENANCE LETTER (OUTPATIENT)
Age: 57
End: 2022-09-25

## 2022-10-12 ENCOUNTER — APPOINTMENT (OUTPATIENT)
Dept: MRI IMAGING | Facility: HOSPITAL | Age: 57
End: 2022-10-12
Attending: FAMILY MEDICINE
Payer: COMMERCIAL

## 2022-10-12 ENCOUNTER — HOSPITAL ENCOUNTER (EMERGENCY)
Facility: HOSPITAL | Age: 57
Discharge: HOME | End: 2022-10-12
Attending: EMERGENCY MEDICINE | Admitting: EMERGENCY MEDICINE
Payer: COMMERCIAL

## 2022-10-12 VITALS
WEIGHT: 119.05 LBS | HEART RATE: 56 BPM | TEMPERATURE: 97.8 F | DIASTOLIC BLOOD PRESSURE: 77 MMHG | OXYGEN SATURATION: 100 % | BODY MASS INDEX: 24.88 KG/M2 | SYSTOLIC BLOOD PRESSURE: 132 MMHG | RESPIRATION RATE: 17 BRPM

## 2022-10-12 DIAGNOSIS — R20.2 PARESTHESIAS: ICD-10-CM

## 2022-10-12 LAB
ANION GAP SERPL CALCULATED.3IONS-SCNC: 10 MMOL/L (ref 7–15)
BASOPHILS # BLD AUTO: 0 10E3/UL (ref 0–0.2)
BASOPHILS NFR BLD AUTO: 0 %
BUN SERPL-MCNC: 10.6 MG/DL (ref 6–20)
CALCIUM SERPL-MCNC: 9.4 MG/DL (ref 8.6–10)
CHLORIDE SERPL-SCNC: 102 MMOL/L (ref 98–107)
CREAT SERPL-MCNC: 0.63 MG/DL (ref 0.51–0.95)
DEPRECATED HCO3 PLAS-SCNC: 29 MMOL/L (ref 22–29)
EOSINOPHIL # BLD AUTO: 0.1 10E3/UL (ref 0–0.7)
EOSINOPHIL NFR BLD AUTO: 2 %
ERYTHROCYTE [DISTWIDTH] IN BLOOD BY AUTOMATED COUNT: 12.3 % (ref 10–15)
GFR SERPL CREATININE-BSD FRML MDRD: >90 ML/MIN/1.73M2
GLUCOSE SERPL-MCNC: 177 MG/DL (ref 70–99)
HCT VFR BLD AUTO: 42.7 % (ref 35–47)
HGB BLD-MCNC: 13.7 G/DL (ref 11.7–15.7)
IMM GRANULOCYTES # BLD: 0 10E3/UL
IMM GRANULOCYTES NFR BLD: 0 %
LYMPHOCYTES # BLD AUTO: 1.9 10E3/UL (ref 0.8–5.3)
LYMPHOCYTES NFR BLD AUTO: 38 %
MAGNESIUM SERPL-MCNC: 2.3 MG/DL (ref 1.7–2.3)
MCH RBC QN AUTO: 28.7 PG (ref 26.5–33)
MCHC RBC AUTO-ENTMCNC: 32.1 G/DL (ref 31.5–36.5)
MCV RBC AUTO: 90 FL (ref 78–100)
MONOCYTES # BLD AUTO: 0.4 10E3/UL (ref 0–1.3)
MONOCYTES NFR BLD AUTO: 7 %
NEUTROPHILS # BLD AUTO: 2.6 10E3/UL (ref 1.6–8.3)
NEUTROPHILS NFR BLD AUTO: 53 %
NRBC # BLD AUTO: 0 10E3/UL
NRBC BLD AUTO-RTO: 0 /100
PLATELET # BLD AUTO: 229 10E3/UL (ref 150–450)
POTASSIUM SERPL-SCNC: 3.7 MMOL/L (ref 3.4–5.3)
RBC # BLD AUTO: 4.77 10E6/UL (ref 3.8–5.2)
SODIUM SERPL-SCNC: 141 MMOL/L (ref 136–145)
TROPONIN T SERPL HS-MCNC: <6 NG/L
WBC # BLD AUTO: 5 10E3/UL (ref 4–11)

## 2022-10-12 PROCEDURE — 36415 COLL VENOUS BLD VENIPUNCTURE: CPT | Performed by: FAMILY MEDICINE

## 2022-10-12 PROCEDURE — 99285 EMERGENCY DEPT VISIT HI MDM: CPT | Mod: 25

## 2022-10-12 PROCEDURE — 70544 MR ANGIOGRAPHY HEAD W/O DYE: CPT

## 2022-10-12 PROCEDURE — A9585 GADOBUTROL INJECTION: HCPCS

## 2022-10-12 PROCEDURE — 80048 BASIC METABOLIC PNL TOTAL CA: CPT | Performed by: FAMILY MEDICINE

## 2022-10-12 PROCEDURE — 84484 ASSAY OF TROPONIN QUANT: CPT | Performed by: FAMILY MEDICINE

## 2022-10-12 PROCEDURE — 70553 MRI BRAIN STEM W/O & W/DYE: CPT

## 2022-10-12 PROCEDURE — 85025 COMPLETE CBC W/AUTO DIFF WBC: CPT | Performed by: FAMILY MEDICINE

## 2022-10-12 PROCEDURE — 255N000002 HC RX 255 OP 636

## 2022-10-12 PROCEDURE — 70549 MR ANGIOGRAPH NECK W/O&W/DYE: CPT

## 2022-10-12 PROCEDURE — 93005 ELECTROCARDIOGRAM TRACING: CPT | Performed by: FAMILY MEDICINE

## 2022-10-12 PROCEDURE — 83735 ASSAY OF MAGNESIUM: CPT | Performed by: FAMILY MEDICINE

## 2022-10-12 RX ORDER — GADOBUTROL 604.72 MG/ML
6 INJECTION INTRAVENOUS ONCE
Status: COMPLETED | OUTPATIENT
Start: 2022-10-12 | End: 2022-10-12

## 2022-10-12 RX ADMIN — GADOBUTROL 6 ML: 604.72 INJECTION INTRAVENOUS at 18:51

## 2022-10-12 ASSESSMENT — ACTIVITIES OF DAILY LIVING (ADL): ADLS_ACUITY_SCORE: 35

## 2022-10-12 NOTE — ED NOTES
"ED Provider In Triage Note  [facility]  Encounter Date: Oct 12, 2022    Chief Complaint   Patient presents with     Numbness       Brief HPI:   Dedra Montero is a 57 year old female presenting to the Emergency Department with a chief complaint of right face and neck numbness for 1 week. No chest pain, no head pain.  Also abdominal pain for \"a while\" for which she is already seeing a doctor.    Brief Physical Exam:  BP (!) 147/71   Pulse 61   Temp 97.8  F (36.6  C) (Temporal)   Resp 12   Wt 54 kg (119 lb 0.8 oz)   SpO2 100%   BMI 24.88 kg/m    General: Non-toxic appearing  HEENT: Atraumatic  Resp: No respiratory distress  Abdomen: Non-peritoneal  Neuro: Alert, oriented, answers questions appropriately  Psych: Behavior appropriate    Plan Initiated in Triage:  No orders of the defined types were placed in this encounter.    PIT Dispo:   Return to lobby while awaiting workup and ED bed availability    Allen Santiago MD on 10/12/2022 at 3:21 PM    Patient was evaluated by the Physician in Triage due to a limitation of available rooms in the Emergency Department. A plan of care was discussed based on the information obtained on the initial evaluation and patient was consuled to return back to the Emergency Department lobby after this initial evalutaiton until results were obtained or a room became available in the Emergency Department. Patient was counseled not to leave prior to receiving the results of their workup.     Allen Santiago MD  [facility] EMERGENCY DEPARTMENT  [address] [place] [id number]  [phone]       Allen Santiago MD  10/12/22 1528    "

## 2022-10-13 LAB
ATRIAL RATE - MUSE: 57 BPM
DIASTOLIC BLOOD PRESSURE - MUSE: NORMAL MMHG
INTERPRETATION ECG - MUSE: NORMAL
P AXIS - MUSE: 72 DEGREES
PR INTERVAL - MUSE: 186 MS
QRS DURATION - MUSE: 94 MS
QT - MUSE: 422 MS
QTC - MUSE: 410 MS
R AXIS - MUSE: 95 DEGREES
SYSTOLIC BLOOD PRESSURE - MUSE: NORMAL MMHG
T AXIS - MUSE: 71 DEGREES
VENTRICULAR RATE- MUSE: 57 BPM

## 2022-10-13 NOTE — DISCHARGE INSTRUCTIONS
Fortunately your testing today has been normal.  Follow-up closely with your primary care doctor and return to the ER for any worsening symptoms or other concerns.

## 2022-10-13 NOTE — ED PROVIDER NOTES
EMERGENCY DEPARTMENT ENCOUnter      NAME: Dedra Montero  AGE: 57 year old female  YOB: 1965  MRN: [id number]  EVALUATION DATE & TIME: 10/12/2022  6:55 PM    PCP: Allison Eller    ED PROVIDER: Sergio Dejesus DO      Chief Complaint   Patient presents with     Numbness         FINAL IMPRESSION:  1. Paresthesias          ED COURSE & MEDICAL DECISION MAKIN:35 PM I met with the patient in room hallway 9 to gather history and to perform my initial exam. We discussed plans for the ED course, including diagnostic testing and treatment.    7:57 PM Rechecked and updated patient. I discussed plans for discharge with the patient, which they were agreeable to. We discussed supportive cares at home and reasons for return to the ER including new or worsening symptoms. All questions and concerns were addressed. Patient to be discharged by RN.     The patient presented to the emergency department today with complaints of numbness throughout the right face and extending partially down the right arm.  Symptoms have been present for 1 week.  No signs of focal weakness on exam.  Laboratory testing today is unremarkable.  MRIs of the head and neck are normal as well so I do not feel that her symptoms are due to stroke or other acute intracranial process.  Given her otherwise well appearance I feel that she can be safely discharged home.  Family is comfortable with this plan.        Medical Decision Making    Supplemental history from: Family Member    External Record(s) Reviewed: N/A    Differential Diagnosis: See MDM charting for differential considered.     I performed an independent interpretation of the: N/A    Discussed with radiology regarding test interpretation: N/A    Discussion of management with another provider: N/A    The following testing was considered but ultimately not selected: None    I considered prescription management with: N/A    The patient's care impacted: None    Consideration of  Admission/Observation: N/A    Care significantly affected by Social Determinants of Health including: N/A       At the conclusion of the encounter I discussed the results of all of the tests and the disposition. The questions were answered. The patient or family acknowledged understanding and was agreeable with the care plan.          =================================================================    HPI        Dedra Montero is a 57 year old female with a pertinent medical history of T2DM and peripheral neuropathy who presents to this ED via walk-in for evaluation of numbness.    Patient's son reports that the patient states that approximately one week ago she developed right-sided facial numbness. He states that the patient mentions that throughout the week the numbness has been progressively radiating to her right neck, right shoulder, and right arm. He denies any patient reports of weakness. He mentions that the patient also reports recently having chest palpitations with associated chest pain. He endorses a patient PMH of DM. He denies any other patient complications at this time.     REVIEW OF SYSTEMS     Constitutional:  Denies fever or chills  HENT:  Denies sore throat   Respiratory:  Denies cough or shortness of breath   Cardiovascular:  Positive for palpitations. Positive for chest pain (secondary to palpitations).  GI:  Denies abdominal pain, nausea, or vomiting  Musculoskeletal:  Denies any new extremity pain   Skin:  Denies rash   Neurologic:  Positive for numbness (right side of face, right neck, right shoulder, and right arm) Denies headache or focal weakness.  All other systems reviewed and are negative      PAST MEDICAL HISTORY:  History reviewed. No pertinent past medical history.    PAST SURGICAL HISTORY:  Past Surgical History:   Procedure Laterality Date     NO PAST SURGERIES             CURRENT MEDICATIONS:    benztropine (COGENTIN) 1 MG tablet  metFORMIN (GLUCOPHAGE XR) 500 MG 24 hr  tablet  paliperidone ER (INVEGA) 6 MG 24 hr tablet        ALLERGIES:  No Known Allergies    FAMILY HISTORY:  Family History   Problem Relation Age of Onset     Diabetes Mother      Hearing Loss Mother      No Known Problems Father      No Known Problems Sister      No Known Problems Sister      No Known Problems Sister      No Known Problems Sister      No Known Problems Brother      No Known Problems Brother      No Known Problems Brother        SOCIAL HISTORY:   Social History     Socioeconomic History     Marital status:      Spouse name: None     Number of children: None     Years of education: None     Highest education level: None   Tobacco Use     Smoking status: Never     Smokeless tobacco: Never   Substance and Sexual Activity     Alcohol use: No     Drug use: No     Sexual activity: Yes     Partners: Male     Birth control/protection: Post-menopausal   Social History Narrative    The patient lives with  and family.  Born in [facility], in [facility] x 13 years, to  1990       VITALS:  Patient Vitals for the past 24 hrs:   BP Temp Temp src Pulse Resp SpO2 Weight   10/12/22 2022 132/77 -- -- 56 17 100 % --   10/12/22 2000 136/79 -- -- 63 16 99 % --   10/12/22 1945 132/74 -- -- 59 17 99 % --   10/12/22 1930 (!) 143/75 -- -- 61 17 100 % --   10/12/22 1523 (!) 147/71 97.8  F (36.6  C) Temporal 61 12 100 % 54 kg (119 lb 0.8 oz)       PHYSICAL EXAM    Constitutional:  Well developed, Well nourished,  HENT:  Normocephalic, Atraumatic, Bilateral external ears normal, Oropharynx moist, Nose normal.   Neck:  Normal range of motion, No meningismus, No stridor.   Eyes:  EOMI, Conjunctiva normal, No discharge.   Respiratory:  Normal breath sounds, No respiratory distress, No wheezing, No chest tenderness.   Cardiovascular:  Normal heart rate, Normal rhythm, No murmurs  GI:  Soft, No tenderness, No guarding, No CVA tenderness.   Musculoskeletal:  No tenderness to palpation or major deformities noted.    Integument:  Warm, Dry, No erythema, No rash.   Neurologic:  Alert & oriented x 3, Normal motor function, Normal sensory function, No focal deficits noted.   Psychiatric:  Affect normal, Judgment normal, Mood normal.      LAB:  All pertinent labs reviewed and interpreted.  Results for orders placed or performed during the hospital encounter of 10/12/22                                    Basic metabolic panel   Result Value Ref Range    Sodium 141 136 - 145 mmol/L    Potassium 3.7 3.4 - 5.3 mmol/L    Chloride 102 98 - 107 mmol/L    Carbon Dioxide (CO2) 29 22 - 29 mmol/L    Anion Gap 10 7 - 15 mmol/L    Urea Nitrogen 10.6 6.0 - 20.0 mg/dL    Creatinine 0.63 0.51 - 0.95 mg/dL    Calcium 9.4 8.6 - 10.0 mg/dL    Glucose 177 (H) 70 - 99 mg/dL    GFR Estimate >90 >60 mL/min/1.73m2   Result Value Ref Range    Magnesium 2.3 1.7 - 2.3 mg/dL   Result Value Ref Range    Troponin T, High Sensitivity <6 <=14 ng/L   CBC with platelets and differential   Result Value Ref Range    WBC Count 5.0 4.0 - 11.0 10e3/uL    RBC Count 4.77 3.80 - 5.20 10e6/uL    Hemoglobin 13.7 11.7 - 15.7 g/dL    Hematocrit 42.7 35.0 - 47.0 %    MCV 90 78 - 100 fL    MCH 28.7 26.5 - 33.0 pg    MCHC 32.1 31.5 - 36.5 g/dL    RDW 12.3 10.0 - 15.0 %    Platelet Count 229 150 - 450 10e3/uL    % Neutrophils 53 %    % Lymphocytes 38 %    % Monocytes 7 %    % Eosinophils 2 %    % Basophils 0 %    % Immature Granulocytes 0 %    NRBCs per 100 WBC 0 <1 /100    Absolute Neutrophils 2.6 1.6 - 8.3 10e3/uL    Absolute Lymphocytes 1.9 0.8 - 5.3 10e3/uL    Absolute Monocytes 0.4 0.0 - 1.3 10e3/uL    Absolute Eosinophils 0.1 0.0 - 0.7 10e3/uL    Absolute Basophils 0.0 0.0 - 0.2 10e3/uL    Absolute Immature Granulocytes 0.0 <=0.4 10e3/uL    Absolute NRBCs 0.0 10e3/uL       RADIOLOGY:  I have independently reviewed and interpreted the above imaging, pending the final radiology read.  MRA Neck (Carotids) wo & w Contrast   Final Result   IMPRESSION:   HEAD MRI:    1.  Image  quality degraded by motion.   2.  No acute intracranial abnormality.      HEAD MRA:    1.  Image quality degraded by motion.   2.  No large artery stenosis or occlusion.      NECK MRA:   1.  No carotid or vertebral artery stenosis.      MRA Brain ([place]) wo Contrast   Final Result   IMPRESSION:   HEAD MRI:    1.  Image quality degraded by motion.   2.  No acute intracranial abnormality.      HEAD MRA:    1.  Image quality degraded by motion.   2.  No large artery stenosis or occlusion.      NECK MRA:   1.  No carotid or vertebral artery stenosis.      MR Brain w/o & w Contrast   Final Result   IMPRESSION:   HEAD MRI:    1.  Image quality degraded by motion.   2.  No acute intracranial abnormality.      HEAD MRA:    1.  Image quality degraded by motion.   2.  No large artery stenosis or occlusion.      NECK MRA:   1.  No carotid or vertebral artery stenosis.          EKG:    Sinus bradycardia at 57 bpm.  Normal axis.  No signs of acute ischemia.  QRS 94 ms,  ms.    I have independently reviewed and interpreted this EKG          I, Xavi Soto, am serving as a scribe to document services personally performed by Dr. Dejesus based on my observation and the provider's statements to me. I, Sergio Dejesus, DO attest that Xavi Soto is acting in a scribe capacity, has observed my performance of the services and has documented them in accordance with my direction.    Sergio Dejesus, DO  Emergency Medicine  [facility] EMERGENCY DEPARTMENT  [facility] [facility] [address]  [phone]  Dept: [phone]       Sergio Dejesus MD  10/13/22 0031

## 2022-11-01 PROBLEM — Z53.20 COLONOSCOPY REFUSED: Status: ACTIVE | Noted: 2021-04-02

## 2022-11-01 NOTE — PROGRESS NOTES
Assessment/Plan:    Type 2 diabetes mellitus without complication, without long-term current use of insulin (H)  Hx DM2, last A1c 6.6%, currently taking metformin. Will recheck A1c today. Refilled metformin and diabetic supplies as below for her trip.   - HEMOGLOBIN A1C  - metFORMIN (GLUCOPHAGE XR) 500 MG 24 hr tablet  Dispense: 180 tablet; Refill: 1  - blood glucose (NO BRAND SPECIFIED) test strip  Dispense: 100 strip; Refill: 6  - thin (NO BRAND SPECIFIED) lancets  Dispense: 1 each; Refill: 3  - alcohol swab prep pads  Dispense: 100 each; Refill: 3    Encounter for vaccination  Due for vaccines, administered today.  - INFLUENZA QUAD, RECOMBINANT, P-FREE (RIV4) (FLUBLOK) AGE 50-64 [RIS593]  - COVID-19,PF,MODERNA (18+ YRS BOOSTER .25ML)    Travel advice encounter  Pt traveling to [place] and [place] - recommend malaria ppx and typhoid fever vaccination - ordered as below.   - doxycycline monohydrate (MONODOX) 100 MG capsule  Dispense: 60 capsule; Refill: 0  - typhoid (VIVOTIF) CR capsule  Dispense: 4 capsule; Refill: 0    Facial tingling sensation  Acute intractable headache, unspecified headache type  Evaluated in ER for this and normal MRI head/neck and labs as below. From what I can gather I suspect this is a headache syndrome vs trigeminal neuralgia perhaps - will check a few other labs as below and recommend evaluation with neurology.   - Vitamin B12  - TSH with free T4 reflex  - Adult Neurology  Referral      Follow up: pending test results    Allison Eller MD  [facility]    Subjective:   Dedra Montero is a 57 year old female is here today for ER follow-up     -[facility] 10/12 - numbness/tingling (R face/neck/shoulder/arm) - nml MRI head/neck - nml bmp, cbc, mag, trop  -still feels this sensation - even right now - comes and goes, is there every day - tries to massage which seems to help, lasting mins to hours - associated with headache, eyes do get a little blurry on the R side when it  happens    -diabetes check - taking metformin, wondering about refill for diabetic test strips/supplies - checks when not feeling well  -had eye apt in June - ROI    -traveling to [place] and [place] - leaving 12/11-1/6 - both cities and rural/smaller communities      Answers for HPI/ROS submitted by the patient on 11/2/2022  If you checked off any problems, how difficult have these problems made it for you to do your work, take care of things at home, or get along with other people?: Somewhat difficult  PHQ9 TOTAL SCORE: 5      Patient Active Problem List   Diagnosis     Peripheral Neuropathy     Papanicolaou smear of cervix with atypical squamous cells cannot exclude high grade squamous intraepithelial lesion (ASC-H)     Chest Pain     Hearing loss     Hx of pyelonephritis     Vitamin D deficiency     Apical lung scarring     Type 2 diabetes mellitus (H)     Depressive disorder     Colonoscopy refused     History reviewed. No pertinent past medical history.  Past Surgical History:   Procedure Laterality Date     NO PAST SURGERIES       Current Outpatient Medications   Medication     alcohol swab prep pads     blood glucose (NO BRAND SPECIFIED) test strip     doxycycline monohydrate (MONODOX) 100 MG capsule     metFORMIN (GLUCOPHAGE XR) 500 MG 24 hr tablet     thin (NO BRAND SPECIFIED) lancets     typhoid (VIVOTIF) CR capsule     benztropine (COGENTIN) 1 MG tablet     paliperidone ER (INVEGA) 6 MG 24 hr tablet     No current facility-administered medications for this visit.     No Known Allergies  Social History     Socioeconomic History     Marital status:      Spouse name: Not on file     Number of children: Not on file     Years of education: Not on file     Highest education level: Not on file   Occupational History     Not on file   Tobacco Use     Smoking status: Never     Smokeless tobacco: Never   Substance and Sexual Activity     Alcohol use: No     Drug use: No     Sexual activity: Yes     Partners:  "Male     Birth control/protection: Post-menopausal   Other Topics Concern     Not on file   Social History Narrative    The patient lives with  and family.  Born in [facility], in [facility] x 13 years, to  1990     Social Determinants of Health     Financial Resource Strain: Not on file   Food Insecurity: Not on file   Transportation Needs: Not on file   Physical Activity: Not on file   Stress: Not on file   Social Connections: Not on file   Intimate Partner Violence: Not on file   Housing Stability: Not on file     Family History   Problem Relation Age of Onset     Diabetes Mother      Hearing Loss Mother      No Known Problems Father      No Known Problems Sister      No Known Problems Sister      No Known Problems Sister      No Known Problems Sister      No Known Problems Brother      No Known Problems Brother      No Known Problems Brother      Review of systems is as stated in HPI, and the remainder of system review is otherwise negative.    Objective:     /70   Pulse 57   Temp 98  F (36.7  C)   Ht 1.47 m (4' 9.87\")   Wt 53.1 kg (117 lb 1 oz)   SpO2 99%   BMI 24.57 kg/m      General appearance: awake, NAD, here with son  HEENT: atraumatic, normocephalic, no scleral icterus or injection  Lungs: breathing comfortably on room air  Diabetic foot exam: normal DP and PT pulses, no trophic changes or ulcerative lesions, normal sensory exam and normal monofilament exam  Skin: no rashes or lesions  Neuro: alert, oriented x3, CNs grossly intact, no focal deficits appreciated  Psych: normal mood/affect/behavior, answering questions appropriately via son who is interpreting  "

## 2022-11-02 ENCOUNTER — OFFICE VISIT (OUTPATIENT)
Dept: FAMILY MEDICINE | Facility: CLINIC | Age: 57
End: 2022-11-02
Payer: COMMERCIAL

## 2022-11-02 VITALS
BODY MASS INDEX: 24.57 KG/M2 | DIASTOLIC BLOOD PRESSURE: 70 MMHG | HEIGHT: 58 IN | OXYGEN SATURATION: 99 % | TEMPERATURE: 98 F | SYSTOLIC BLOOD PRESSURE: 110 MMHG | WEIGHT: 117.06 LBS | HEART RATE: 57 BPM

## 2022-11-02 DIAGNOSIS — Z71.84 TRAVEL ADVICE ENCOUNTER: ICD-10-CM

## 2022-11-02 DIAGNOSIS — R20.2 FACIAL TINGLING SENSATION: ICD-10-CM

## 2022-11-02 DIAGNOSIS — Z23 ENCOUNTER FOR VACCINATION: ICD-10-CM

## 2022-11-02 DIAGNOSIS — R51.9 ACUTE INTRACTABLE HEADACHE, UNSPECIFIED HEADACHE TYPE: ICD-10-CM

## 2022-11-02 DIAGNOSIS — E11.9 TYPE 2 DIABETES MELLITUS WITHOUT COMPLICATION, WITHOUT LONG-TERM CURRENT USE OF INSULIN (H): Primary | ICD-10-CM

## 2022-11-02 LAB
HBA1C MFR BLD: 6.8 % (ref 0–5.6)
TSH SERPL DL<=0.005 MIU/L-ACNC: 0.83 UIU/ML (ref 0.3–4.2)
VIT B12 SERPL-MCNC: 745 PG/ML (ref 232–1245)

## 2022-11-02 PROCEDURE — 36415 COLL VENOUS BLD VENIPUNCTURE: CPT | Performed by: FAMILY MEDICINE

## 2022-11-02 PROCEDURE — 90471 IMMUNIZATION ADMIN: CPT | Performed by: FAMILY MEDICINE

## 2022-11-02 PROCEDURE — 99214 OFFICE O/P EST MOD 30 MIN: CPT | Mod: 25 | Performed by: FAMILY MEDICINE

## 2022-11-02 PROCEDURE — 90682 RIV4 VACC RECOMBINANT DNA IM: CPT | Performed by: FAMILY MEDICINE

## 2022-11-02 PROCEDURE — 0124A COVID-19,PF,PFIZER BOOSTER BIVALENT: CPT | Performed by: FAMILY MEDICINE

## 2022-11-02 PROCEDURE — 83036 HEMOGLOBIN GLYCOSYLATED A1C: CPT | Performed by: FAMILY MEDICINE

## 2022-11-02 PROCEDURE — 84443 ASSAY THYROID STIM HORMONE: CPT | Performed by: FAMILY MEDICINE

## 2022-11-02 PROCEDURE — 82607 VITAMIN B-12: CPT | Performed by: FAMILY MEDICINE

## 2022-11-02 PROCEDURE — 91312 COVID-19,PF,PFIZER BOOSTER BIVALENT: CPT | Performed by: FAMILY MEDICINE

## 2022-11-02 RX ORDER — DOXYCYCLINE 100 MG/1
100 CAPSULE ORAL DAILY
Qty: 60 CAPSULE | Refills: 0 | Status: SHIPPED | OUTPATIENT
Start: 2022-11-02 | End: 2024-02-12

## 2022-11-02 RX ORDER — GLUCOSAMINE HCL/CHONDROITIN SU 500-400 MG
CAPSULE ORAL
Qty: 100 EACH | Refills: 3 | Status: SHIPPED | OUTPATIENT
Start: 2022-11-02

## 2022-11-02 RX ORDER — METFORMIN HCL 500 MG
500 TABLET, EXTENDED RELEASE 24 HR ORAL 2 TIMES DAILY WITH MEALS
Qty: 180 TABLET | Refills: 1 | Status: SHIPPED | OUTPATIENT
Start: 2022-11-02 | End: 2024-02-12

## 2022-11-02 RX ORDER — LANCETS
EACH MISCELLANEOUS
Qty: 1 EACH | Refills: 3 | Status: SHIPPED | OUTPATIENT
Start: 2022-11-02

## 2022-11-02 ASSESSMENT — PAIN SCALES - GENERAL: PAINLEVEL: NO PAIN (0)

## 2022-11-02 ASSESSMENT — PATIENT HEALTH QUESTIONNAIRE - PHQ9
SUM OF ALL RESPONSES TO PHQ QUESTIONS 1-9: 5
SUM OF ALL RESPONSES TO PHQ QUESTIONS 1-9: 5
10. IF YOU CHECKED OFF ANY PROBLEMS, HOW DIFFICULT HAVE THESE PROBLEMS MADE IT FOR YOU TO DO YOUR WORK, TAKE CARE OF THINGS AT HOME, OR GET ALONG WITH OTHER PEOPLE: SOMEWHAT DIFFICULT

## 2022-11-02 NOTE — PATIENT INSTRUCTIONS
For travel:  -start doxycycline for malaria prophylaxis - 100mg (1 capsule) daily - start 1-2 days before travel and then daily during trip and then continue daily for 4 weeks after getting home  -ordered typhoid fever oral vaccine to your pharmacy - send [employer] message or call if the pharmacy does not have this in stock and Dr Eller will order the shot vaccine version at our travel clinic

## 2023-05-08 ENCOUNTER — HEALTH MAINTENANCE LETTER (OUTPATIENT)
Age: 58
End: 2023-05-08

## 2023-06-05 NOTE — PROGRESS NOTES
Assessment/Plan:    Cervical cancer screening  Papanicolaou smear of cervix with atypical squamous cells cannot exclude high grade squamous intraepithelial lesion (ASC-H)  Hx abnormal pap smear, due for repeat cotesting today.   - Pap screen with HPV - recommended age 30 - 65 years    LLQ abdominal pain  Unclear etiology at this time, suspicious for constipation-related pains. Will check labs today, if normal then will work on bowel regimen - if pain persists once stools are regular then would consider pelvic US and possible abdominal CT. Cologuard ordered for cancer screening.   - Comprehensive metabolic panel (BMP + Alb, Alk Phos, ALT, AST, Total. Bili, TP)  - UA Macroscopic with reflex to Microscopic and Culture    Type 2 diabetes mellitus without complication, without long-term current use of insulin (H)  Hx DM2, controlled with metformin, last A1c 6.8% - will check labs today, referral to eye doctor placed today.   - Adult Eye  Referral  - HEMOGLOBIN A1C  - Lipid panel reflex to direct LDL Non-fasting  - Albumin Random Urine Quantitative with Creat Ratio    Lipid screening  Given age, will check lipid panel today.     Hepatitis B vaccination status unknown  Will check hep B titer today.   - Hepatitis B Surface Antibody  - Hepatitis B Surface Antibody    Screen for colon cancer  Due for colon cancer screening, pt prefers cologuard, ordered today.  - COLOGUARD([employer])    Anxiety  Palpitations  Pt describes racing heart during periods of stress/anxiety that occurs occasionally, will try to manage with propranolol, if not successful then would consider hydroxyzine or selective serotonin reuptake inhibitor.  - propranolol (INDERAL) 10 MG tablet  Dispense: 90 tablet; Refill: 1    Thyroid nodule  Known thyroid nodules due for follow-up, ordered today.   - US Thyroid     Follow up: as needed    Allison Eller MD  [facility]    Subjective:   Dedra Montero is a 57 year old female is here today  for pap smear    -abd pain - lower left side, 6/10 pain, daily, started 4 weeks ago, no clear trigger, constant, nothing makes it worse, can get distracted and forget about it, hasn't taken any medication, does feel gassy but no bloating, normal urination, BM every other day and sometimes has to push (normal for her), no vaginal discharge, was lifting stuff in the yard before it happened  -when stressed/depression feels that her heart beats quickly and needs to sit down and relax - wondering if any tests needed or medications, started late last year, happening more frequently, happens often but not daily    Answers for HPI/ROS submitted by the patient on 6/6/2023  If you checked off any problems, how difficult have these problems made it for you to do your work, take care of things at home, or get along with other people?: Somewhat difficult  PHQ9 TOTAL SCORE: 9  How many servings of fruits and vegetables do you eat daily?: 2-3  On average, how many sweetened beverages do you drink each day (Examples: soda, juice, sweet tea, etc.  Do NOT count diet or artificially sweetened beverages)?: 0  How many minutes a day do you exercise enough to make your heart beat faster?: 20 to 29  How many days a week do you exercise enough to make your heart beat faster?: 7  How many days per week do you miss taking your medication?: 0  What is the reason for your visit today?: Lower abdominal pain  When did your symptoms begin?: 3-4 weeks ago  What are your symptoms?: Lower abdominal pain  How would you describe these symptoms?: Mild  Are your symptoms:: Staying the same  Have you had these symptoms before?: No      Patient Active Problem List   Diagnosis     Peripheral Neuropathy     Papanicolaou smear of cervix with atypical squamous cells cannot exclude high grade squamous intraepithelial lesion (ASC-H)     Chest Pain     Hearing loss     Hx of pyelonephritis     Vitamin D deficiency     Apical lung scarring     Type 2 diabetes  mellitus (H)     Depressive disorder     Colonoscopy refused     History reviewed. No pertinent past medical history.  Past Surgical History:   Procedure Laterality Date     NO PAST SURGERIES       Current Outpatient Medications   Medication     alcohol swab prep pads     benztropine (COGENTIN) 1 MG tablet     blood glucose (NO BRAND SPECIFIED) test strip     doxycycline monohydrate (MONODOX) 100 MG capsule     metFORMIN (GLUCOPHAGE XR) 500 MG 24 hr tablet     paliperidone ER (INVEGA) 6 MG 24 hr tablet     propranolol (INDERAL) 10 MG tablet     thin (NO BRAND SPECIFIED) lancets     No current facility-administered medications for this visit.     No Known Allergies  Social History     Socioeconomic History     Marital status:      Spouse name: Not on file     Number of children: Not on file     Years of education: Not on file     Highest education level: Not on file   Occupational History     Not on file   Tobacco Use     Smoking status: Never     Smokeless tobacco: Never   Vaping Use     Vaping status: Not on file   Substance and Sexual Activity     Alcohol use: No     Drug use: No     Sexual activity: Yes     Partners: Male     Birth control/protection: Post-menopausal   Other Topics Concern     Not on file   Social History Narrative    The patient lives with  and family.  Born in [facility], in [facility] x 13 years, to  1990     Social Determinants of Health     Financial Resource Strain: Not on file   Food Insecurity: Not on file   Transportation Needs: Not on file   Physical Activity: Not on file   Stress: Not on file   Social Connections: Not on file   Intimate Partner Violence: Not on file   Housing Stability: Not on file     Family History   Problem Relation Age of Onset     Diabetes Mother      Hearing Loss Mother      No Known Problems Father      No Known Problems Sister      No Known Problems Sister      No Known Problems Sister      No Known Problems Sister      No Known Problems Brother      No  "Known Problems Brother      No Known Problems Brother      Review of systems is as stated in HPI, and the remainder of system review is otherwise negative.    Objective:     /70 (BP Location: Left arm, Patient Position: Sitting, Cuff Size: Adult Regular)   Pulse 75   Temp 99.3  F (37.4  C) (Temporal)   Resp 16   Ht 1.463 m (4' 9.6\")   Wt 50.8 kg (111 lb 14.4 oz)   SpO2 95%   BMI 23.71 kg/m      General appearance: awake, NAD, here with son  HEENT: atraumatic, normocephalic, no scleral icterus or injection  CV: RRR, no murmurs/rubs/gallops, normal S1 and S2  Lungs: CTAB, no wheezes or crackles, breathing comfortably on room air  Abd: active bowel sounds, soft, non-distended, mildly tender in lower abdomen midline and slightly to left  : normal external genitalia, normal appearing cervix, no discharge, evidence of uterine and bladder prolapse  Neuro: alert, oriented x3, CNs grossly intact, no focal deficits appreciated  Psych: normal mood/affect/behavior, answering questions appropriately through son who acts as     "

## 2023-06-06 ENCOUNTER — LAB (OUTPATIENT)
Dept: FAMILY MEDICINE | Facility: CLINIC | Age: 58
End: 2023-06-06

## 2023-06-06 ENCOUNTER — OFFICE VISIT (OUTPATIENT)
Dept: FAMILY MEDICINE | Facility: CLINIC | Age: 58
End: 2023-06-06
Payer: COMMERCIAL

## 2023-06-06 VITALS
SYSTOLIC BLOOD PRESSURE: 112 MMHG | BODY MASS INDEX: 23.49 KG/M2 | DIASTOLIC BLOOD PRESSURE: 70 MMHG | TEMPERATURE: 99.3 F | WEIGHT: 111.9 LBS | HEIGHT: 58 IN | OXYGEN SATURATION: 95 % | RESPIRATION RATE: 16 BRPM | HEART RATE: 75 BPM

## 2023-06-06 DIAGNOSIS — Z13.220 LIPID SCREENING: ICD-10-CM

## 2023-06-06 DIAGNOSIS — Z12.11 SCREEN FOR COLON CANCER: ICD-10-CM

## 2023-06-06 DIAGNOSIS — Z78.9 HEPATITIS B VACCINATION STATUS UNKNOWN: ICD-10-CM

## 2023-06-06 DIAGNOSIS — R10.32 LLQ ABDOMINAL PAIN: ICD-10-CM

## 2023-06-06 DIAGNOSIS — E11.9 TYPE 2 DIABETES MELLITUS WITHOUT COMPLICATION, WITHOUT LONG-TERM CURRENT USE OF INSULIN (H): ICD-10-CM

## 2023-06-06 DIAGNOSIS — Z12.4 CERVICAL CANCER SCREENING: Primary | ICD-10-CM

## 2023-06-06 DIAGNOSIS — F41.9 ANXIETY: ICD-10-CM

## 2023-06-06 DIAGNOSIS — R87.611 PAPANICOLAOU SMEAR OF CERVIX WITH ATYPICAL SQUAMOUS CELLS CANNOT EXCLUDE HIGH GRADE SQUAMOUS INTRAEPITHELIAL LESION (ASC-H): ICD-10-CM

## 2023-06-06 DIAGNOSIS — E04.1 THYROID NODULE: ICD-10-CM

## 2023-06-06 DIAGNOSIS — R00.2 PALPITATIONS: ICD-10-CM

## 2023-06-06 DIAGNOSIS — Z23 ENCOUNTER FOR VACCINATION: ICD-10-CM

## 2023-06-06 LAB
ALBUMIN UR-MCNC: NEGATIVE MG/DL
APPEARANCE UR: CLEAR
BILIRUB UR QL STRIP: NEGATIVE
COLOR UR AUTO: YELLOW
CREAT UR-MCNC: 14.2 MG/DL
GLUCOSE UR STRIP-MCNC: NEGATIVE MG/DL
HBA1C MFR BLD: 6.4 % (ref 0–5.6)
HGB UR QL STRIP: NEGATIVE
KETONES UR STRIP-MCNC: NEGATIVE MG/DL
LEUKOCYTE ESTERASE UR QL STRIP: NEGATIVE
MICROALBUMIN UR-MCNC: <12 MG/L
MICROALBUMIN/CREAT UR: NORMAL MG/G{CREAT}
NITRATE UR QL: NEGATIVE
PH UR STRIP: 5 [PH] (ref 5–8)
SP GR UR STRIP: <=1.005 (ref 1–1.03)
UROBILINOGEN UR STRIP-ACNC: 0.2 E.U./DL

## 2023-06-06 PROCEDURE — 81003 URINALYSIS AUTO W/O SCOPE: CPT | Performed by: FAMILY MEDICINE

## 2023-06-06 PROCEDURE — G0124 SCREEN C/V THIN LAYER BY MD: HCPCS | Performed by: PATHOLOGY

## 2023-06-06 PROCEDURE — 36415 COLL VENOUS BLD VENIPUNCTURE: CPT | Performed by: FAMILY MEDICINE

## 2023-06-06 PROCEDURE — 83036 HEMOGLOBIN GLYCOSYLATED A1C: CPT | Performed by: FAMILY MEDICINE

## 2023-06-06 PROCEDURE — 80061 LIPID PANEL: CPT | Performed by: FAMILY MEDICINE

## 2023-06-06 PROCEDURE — 87624 HPV HI-RISK TYP POOLED RSLT: CPT | Performed by: FAMILY MEDICINE

## 2023-06-06 PROCEDURE — 86706 HEP B SURFACE ANTIBODY: CPT | Performed by: FAMILY MEDICINE

## 2023-06-06 PROCEDURE — 99214 OFFICE O/P EST MOD 30 MIN: CPT | Performed by: FAMILY MEDICINE

## 2023-06-06 PROCEDURE — 82570 ASSAY OF URINE CREATININE: CPT | Performed by: FAMILY MEDICINE

## 2023-06-06 PROCEDURE — 82043 UR ALBUMIN QUANTITATIVE: CPT | Performed by: FAMILY MEDICINE

## 2023-06-06 PROCEDURE — 80053 COMPREHEN METABOLIC PANEL: CPT | Performed by: FAMILY MEDICINE

## 2023-06-06 PROCEDURE — G0145 SCR C/V CYTO,THINLAYER,RESCR: HCPCS | Performed by: FAMILY MEDICINE

## 2023-06-06 RX ORDER — PROPRANOLOL HYDROCHLORIDE 10 MG/1
10 TABLET ORAL 3 TIMES DAILY PRN
Qty: 90 TABLET | Refills: 1 | Status: SHIPPED | OUTPATIENT
Start: 2023-06-06 | End: 2024-02-12

## 2023-06-06 ASSESSMENT — PATIENT HEALTH QUESTIONNAIRE - PHQ9
SUM OF ALL RESPONSES TO PHQ QUESTIONS 1-9: 9
10. IF YOU CHECKED OFF ANY PROBLEMS, HOW DIFFICULT HAVE THESE PROBLEMS MADE IT FOR YOU TO DO YOUR WORK, TAKE CARE OF THINGS AT HOME, OR GET ALONG WITH OTHER PEOPLE: SOMEWHAT DIFFICULT
SUM OF ALL RESPONSES TO PHQ QUESTIONS 1-9: 9

## 2023-06-06 ASSESSMENT — PAIN SCALES - GENERAL: PAINLEVEL: SEVERE PAIN (6)

## 2023-06-07 LAB
ALBUMIN SERPL BCG-MCNC: 4.4 G/DL (ref 3.5–5.2)
ALP SERPL-CCNC: 113 U/L (ref 35–104)
ALT SERPL W P-5'-P-CCNC: 16 U/L (ref 10–35)
ANION GAP SERPL CALCULATED.3IONS-SCNC: 10 MMOL/L (ref 7–15)
AST SERPL W P-5'-P-CCNC: 22 U/L (ref 10–35)
BILIRUB SERPL-MCNC: 0.2 MG/DL
BUN SERPL-MCNC: 13.7 MG/DL (ref 6–20)
CALCIUM SERPL-MCNC: 9.5 MG/DL (ref 8.6–10)
CHLORIDE SERPL-SCNC: 109 MMOL/L (ref 98–107)
CHOLEST SERPL-MCNC: 184 MG/DL
CREAT SERPL-MCNC: 0.65 MG/DL (ref 0.51–0.95)
DEPRECATED HCO3 PLAS-SCNC: 24 MMOL/L (ref 22–29)
GFR SERPL CREATININE-BSD FRML MDRD: >90 ML/MIN/1.73M2
GLUCOSE SERPL-MCNC: 112 MG/DL (ref 70–99)
HBV SURFACE AB SERPL IA-ACNC: 0.02 M[IU]/ML
HBV SURFACE AB SERPL IA-ACNC: NONREACTIVE M[IU]/ML
HDLC SERPL-MCNC: 72 MG/DL
LDLC SERPL CALC-MCNC: 60 MG/DL
NONHDLC SERPL-MCNC: 112 MG/DL
POTASSIUM SERPL-SCNC: 4 MMOL/L (ref 3.4–5.3)
PROT SERPL-MCNC: 7.7 G/DL (ref 6.4–8.3)
SODIUM SERPL-SCNC: 143 MMOL/L (ref 136–145)
TRIGL SERPL-MCNC: 260 MG/DL

## 2023-06-14 ENCOUNTER — PATIENT OUTREACH (OUTPATIENT)
Dept: FAMILY MEDICINE | Facility: CLINIC | Age: 58
End: 2023-06-14
Payer: COMMERCIAL

## 2023-06-21 LAB — NONINV COLON CA DNA+OCC BLD SCRN STL QL: NEGATIVE

## 2023-06-26 ENCOUNTER — HOSPITAL ENCOUNTER (OUTPATIENT)
Dept: ULTRASOUND IMAGING | Facility: CLINIC | Age: 58
Discharge: HOME | End: 2023-06-26
Attending: FAMILY MEDICINE
Payer: COMMERCIAL

## 2023-06-26 DIAGNOSIS — R10.32 LLQ ABDOMINAL PAIN: ICD-10-CM

## 2023-06-26 DIAGNOSIS — E04.1 THYROID NODULE: ICD-10-CM

## 2023-06-26 PROCEDURE — 76536 US EXAM OF HEAD AND NECK: CPT

## 2023-06-26 PROCEDURE — 76830 TRANSVAGINAL US NON-OB: CPT

## 2023-06-27 ENCOUNTER — OFFICE VISIT (OUTPATIENT)
Dept: NEUROLOGY | Facility: CLINIC | Age: 58
End: 2023-06-27
Attending: FAMILY MEDICINE
Payer: COMMERCIAL

## 2023-06-27 VITALS — OXYGEN SATURATION: 99 % | DIASTOLIC BLOOD PRESSURE: 68 MMHG | SYSTOLIC BLOOD PRESSURE: 127 MMHG | HEART RATE: 53 BPM

## 2023-06-27 DIAGNOSIS — R51.9 ACUTE INTRACTABLE HEADACHE, UNSPECIFIED HEADACHE TYPE: ICD-10-CM

## 2023-06-27 DIAGNOSIS — M54.2 CERVICALGIA: ICD-10-CM

## 2023-06-27 DIAGNOSIS — M54.12 CERVICAL RADICULOPATHY: ICD-10-CM

## 2023-06-27 DIAGNOSIS — R20.2 FACIAL TINGLING SENSATION: Primary | ICD-10-CM

## 2023-06-27 PROCEDURE — 99204 OFFICE O/P NEW MOD 45 MIN: CPT

## 2023-06-27 RX ORDER — GABAPENTIN 100 MG/1
100 CAPSULE ORAL 2 TIMES DAILY
Qty: 90 CAPSULE | Refills: 3 | Status: SHIPPED | OUTPATIENT
Start: 2023-06-27

## 2023-06-27 ASSESSMENT — HEADACHE IMPACT TEST (HIT 6)
HOW OFTEN DO HEADACHES LIMIT YOUR DAILY ACTIVITIES: SOMETIMES
WHEN YOU HAVE A HEADACHE HOW OFTEN DO YOU WISH YOU COULD LIE DOWN: SOMETIMES
HIT6 TOTAL SCORE: 69
HOW OFTEN HAVE YOU FELT TOO TIRED TO WORK BECAUSE OF YOUR HEADACHES: ALWAYS
WHEN YOU HAVE HEADACHES HOW OFTEN IS THE PAIN SEVERE: SOMETIMES
HOW OFTEN HAVE YOU FELT FED UP OR IRRITATED BECAUSE OF YOUR HEADACHES: ALWAYS
HOW OFTEN DID HEADACHS LIMIT CONCENTRATION ON WORK OR DAILY ACTIVITY: ALWAYS

## 2023-06-27 NOTE — PATIENT INSTRUCTIONS
Gabapentin  Take 1 capsule by mouth at bedtime for 1-2 weeks  If tolerating okay, can try taking twice daily (morning and night)  If helpful, but still getting some tingling or headache, let me know and we can increase the dose  Side Effects: dizziness, drowsiness, nausea.    Ibuprofen or Acetaminophen (Advil and Tylenol)  Okay to use for headache but limit to 14 days per month
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
## 2023-06-27 NOTE — NURSING NOTE
"Dedra Montero is a 58 year old female who presents for:  Chief Complaint   Patient presents with     Consult     Headache mainly on right side, sometimes it gets numb         Initial Vitals:  /68   Pulse 53   SpO2 99%  Estimated body mass index is 23.71 kg/m  as calculated from the following:    Height as of 6/6/23: 1.463 m (4' 9.6\").    Weight as of 6/6/23: 50.8 kg (111 lb 14.4 oz).. There is no height or weight on file to calculate BSA. BP completed using cuff size: regular  Data Unavailable    Nursing Comments:       Kiera Preciado    "
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
## 2023-06-29 ENCOUNTER — APPOINTMENT (OUTPATIENT)
Dept: INTERPRETER SERVICES | Facility: CLINIC | Age: 58
End: 2023-06-29
Payer: COMMERCIAL

## 2023-07-19 DIAGNOSIS — N81.4 UTERINE PROLAPSE: Primary | ICD-10-CM

## 2023-10-14 ENCOUNTER — HEALTH MAINTENANCE LETTER (OUTPATIENT)
Age: 58
End: 2023-10-14

## 2023-11-16 ENCOUNTER — PATIENT OUTREACH (OUTPATIENT)
Dept: CARE COORDINATION | Facility: CLINIC | Age: 58
End: 2023-11-16
Payer: COMMERCIAL

## 2023-11-22 ENCOUNTER — PATIENT OUTREACH (OUTPATIENT)
Dept: FAMILY MEDICINE | Facility: CLINIC | Age: 58
End: 2023-11-22
Payer: COMMERCIAL

## 2023-12-14 ENCOUNTER — PATIENT OUTREACH (OUTPATIENT)
Dept: CARE COORDINATION | Facility: CLINIC | Age: 58
End: 2023-12-14
Payer: COMMERCIAL

## 2024-01-08 ENCOUNTER — ANCILLARY PROCEDURE (OUTPATIENT)
Dept: GENERAL RADIOLOGY | Facility: CLINIC | Age: 59
End: 2024-01-08
Attending: NURSE PRACTITIONER
Payer: COMMERCIAL

## 2024-01-08 ENCOUNTER — OFFICE VISIT (OUTPATIENT)
Dept: FAMILY MEDICINE | Facility: CLINIC | Age: 59
End: 2024-01-08
Payer: COMMERCIAL

## 2024-01-08 VITALS
SYSTOLIC BLOOD PRESSURE: 108 MMHG | HEART RATE: 72 BPM | BODY MASS INDEX: 24.37 KG/M2 | TEMPERATURE: 98.6 F | WEIGHT: 115 LBS | DIASTOLIC BLOOD PRESSURE: 68 MMHG | RESPIRATION RATE: 16 BRPM

## 2024-01-08 DIAGNOSIS — M25.511 ACUTE PAIN OF RIGHT SHOULDER: Primary | ICD-10-CM

## 2024-01-08 DIAGNOSIS — M25.511 ACUTE PAIN OF RIGHT SHOULDER: ICD-10-CM

## 2024-01-08 DIAGNOSIS — R29.810 FACIAL DROOP: ICD-10-CM

## 2024-01-08 PROCEDURE — 99213 OFFICE O/P EST LOW 20 MIN: CPT | Performed by: NURSE PRACTITIONER

## 2024-01-08 PROCEDURE — 73030 X-RAY EXAM OF SHOULDER: CPT | Mod: TC | Performed by: RADIOLOGY

## 2024-01-08 RX ORDER — CYCLOBENZAPRINE HCL 10 MG
10 TABLET ORAL 3 TIMES DAILY PRN
Qty: 20 TABLET | Refills: 0 | Status: SHIPPED | OUTPATIENT
Start: 2024-01-08 | End: 2024-02-12

## 2024-01-08 RX ORDER — NAPROXEN 500 MG/1
500 TABLET ORAL 2 TIMES DAILY WITH MEALS
Qty: 28 TABLET | Refills: 0 | Status: SHIPPED | OUTPATIENT
Start: 2024-01-08 | End: 2024-01-22

## 2024-01-08 ASSESSMENT — PATIENT HEALTH QUESTIONNAIRE - PHQ9
SUM OF ALL RESPONSES TO PHQ QUESTIONS 1-9: 4
10. IF YOU CHECKED OFF ANY PROBLEMS, HOW DIFFICULT HAVE THESE PROBLEMS MADE IT FOR YOU TO DO YOUR WORK, TAKE CARE OF THINGS AT HOME, OR GET ALONG WITH OTHER PEOPLE: NOT DIFFICULT AT ALL
SUM OF ALL RESPONSES TO PHQ QUESTIONS 1-9: 4

## 2024-01-08 ASSESSMENT — ENCOUNTER SYMPTOMS: NECK PAIN: 1

## 2024-01-08 NOTE — COMMUNITY RESOURCES LIST (ENGLISH)
01/08/2024   [facility]  N/A  For questions about this resource list or additional care needs, please contact your primary care clinic or care manager.  Phone: [phone]   Email: N/A   Address: [address] [place] [address]   Hours: N/A        Hotlines and Helplines       Hotline - Housing crisis  1  [facility] Distance: 12.39 miles      Phone/Virtual   [address] [place] [address]  Language: English  Hours: Mon - Sun Open 24 Hours   Phone: (518) 323-5426 Email: communications@[facility].org Website: https://[facility]-[place].org/oursaviourshousing/     2  [facility] Distance: 13.99 miles      Phone/Virtual   [address] [place] [address]  Language: English  Hours: Mon - Sun Open 24 Hours   Phone: (921) 478-4170 Email: info@[facility].Notegraphy Website: http://www.[facility].org          Housing       Coordinated Entry access point  3  [facility] Distance: 4.98 miles      In-Person, Phone/Virtual   [address] [place] [address]  Language: English  Hours: Mon - Fri 8:30 AM - 4:30 PM  Fees: Free   Phone: (879) 865-7727 Email: info@[facility].org Website: https://www.[facility].org/locations/[facility]/     4  [facility] - Coordinated Access to Housing and Shelter (CAHS) - Coordinated Access - Coordinated Entry access point Distance: 7.2 miles      In-Person, Phone/Virtual   [address] [place] [address]  Language: English  Hours: Mon - Fri 8:00 AM - 4:30 PM  Fees: Free   Phone: (682) 473-4493 Website: https://www.[facility]./residents/assistance-support/assistance/housing-services-support     Drop-in center or day shelter  5  [facility] Distance: 3.42 miles      In-Person   [address] [place] [address]  Language: English  Hours: Mon - Fri 9:00 AM - 4:00 PM  Fees: Free   Phone: (846) 105-7117 Email: regulo@[facility].org Website:  http://[facility].Enthuse     6  [facility] Distance: 12.39 miles      In-Person   [address] [place] [address]  Language: English, German, Burmese  Hours: Mon - Sat 7:00 AM - 3:00 PM  Fees: Free, Self Pay   Phone: (406) 897-1803 Email: info@[facility] Website: https://www.[place]/locations/opportunity-center/     Housing search assistance  7  [facility] - Housing Search Assistance Distance: 4.62 miles      Phone/Virtual   [address] [place] [address]  Language: Divehi, English, Hmong, Katarina, German, Burmese  Hours: Mon - Fri Appt. Only  Fees: Free   Phone: (739) 343-6643 Website: https://[facility].Enthuse/     8  [facility] - Online Housing Search Assistance Distance: 8.06 miles      Phone/Virtual   [address] [place] [address]  Language: English  Hours: Mon - Sun Open 24 Hours  Fees: Free   Phone: (125) 694-8639 Email: jonnathan@[facility] Website: https://[facility].Enthuse/     Shelter for families  9  [facility] Distance: 12.43 miles      In-Person   [address] [place] [address]  Language: English  Hours: Mon - Fri 3:00 PM - 9:00 AM , Sat - Sun Open 24 Hours  Fees: Free   Phone: (523) 972-4792 Ext.1 Website: https://www.[facility].org/2020/07/03/emergency-family-shelter/     10  [facility] Distance: 24.63 miles      In-Person   [address] [place] [address]  Language: English  Hours: Mon - Sun Open 24 Hours  Fees: Free, Self Pay   Phone: (852) 638-2750 Email: Louisa@[facility].org Website: http://www.[facility].org/     Shelter for individuals  11  [facility] [place] [facility] [place] [facility] Distance: 5.01 miles      In-Person   [address] [place] [address]  Language: English   Hours: Mon - Sun 5:00 PM - 10:00 AM  Fees: Free, Self Pay   Phone: (432) 515-2202 Email: info@[employer] Website: https://www.[facility]/locations/[facility]/     12  [facility] - Coordinated Access to Housing and Shelter (CAHS) - Coordinated Access - Emergency housing Distance: 7.2 miles      In-Person, [place] [address]  Language: English  Hours: Mon - Fri 8:00 AM - 4:30 PM  Fees: Free   Phone: (468) 820-9520 Website: https://www.[facility]./residents/assistance-support/assistance/housing-services-support          Important Numbers & Websites       Emergency Services   911  [facility] Services   311  Poison Control   (969) 924-1938  Suicide Prevention Lifeline   (835) 932-4725 (TALK)  Child Abuse Hotline   (693) 875-1828 (4-A-Child)  Sexual Assault Hotline   (461) 362-8148 (HOPE)  National Runaway Safeline   (751) 661-8513 (RUNAWAY)  All-Options Talkline   (823) 225-7525  Substance Abuse Referral   (655) 982-2227 (HELP)

## 2024-01-08 NOTE — PROGRESS NOTES
Assessment & Plan     Acute pain of right shoulder  Naproxen and flexeril as needed. Shoulder xray was normal. If worsening or not improving we could consider Physical therapy. If not improving with physical therapy we could refer to ortho.    - naproxen (NAPROSYN) 500 MG tablet; Take 1 tablet (500 mg) by mouth 2 times daily (with meals) for 14 days  - cyclobenzaprine (FLEXERIL) 10 MG tablet; Take 1 tablet (10 mg) by mouth 3 times daily as needed for muscle spasms  - XR Shoulder Right 2 Views; Future    Facial droop  I suspect possible bell's palsy as cause-however I want to evaluate further as it is new.   Follow up pending results of MRI. May need neuro referral.   - MR Brain w/o & w Contrast; Future                 VALERIE WASHINGTON [facility]    Eliane   Dedra is a 58 year old, presenting for the following health issues:  Fall (About 10 days ago slipped and fell on right shoulder outside at home, close to garage) and Musculoskeletal Problem (Right shoulder, neck, head pain)      1/8/2024    10:11 AM   Additional Questions   Roomed by lyubov cain   Accompanied by daughter grace       History of Present Illness       Back Pain:  She presents for follow up of back pain. Patient's back pain is a chronic problem.  Location of back pain:  Right upper back, right side of neck and right shoulder  Description of back pain: burning, sharp and stabbing  Back pain spreads: right side of neck    Since patient first noticed back pain, pain is: gradually worsening  Does back pain interfere with her job:  Yes       She eats 4 or more servings of fruits and vegetables daily.She consumes 3 sweetened beverage(s) daily.She exercises with enough effort to increase her heart rate 20 to 29 minutes per day.  She exercises with enough effort to increase her heart rate 7 days per week.      Turning to right hurts shoulder.  Feels like muscle is very tight.   Patient noted after fall that she  had facial droop on left side (she actually reports that the right side is higher).            Review of Systems   Musculoskeletal:  Positive for neck pain.            Objective    /68 (BP Location: Left arm, Patient Position: Sitting, Cuff Size: Adult Regular)   Pulse 72   Temp 98.6  F (37  C)   Resp 16   Wt 52.2 kg (115 lb)   BMI 24.37 kg/m    Body mass index is 24.37 kg/m .  Physical Exam  Constitutional:       Appearance: Normal appearance.   Musculoskeletal:      Right shoulder: Normal range of motion (pain with circumduction and external rotation).   Neurological:      General: No focal deficit present.      Mental Status: She is alert and oriented to person, place, and time.      Cranial Nerves: Facial asymmetry (left side lip slightly lower than right) present.      Comments: -unable to smile with right side of lip.. left eye brow doesn't move as well either     Psychiatric:         Mood and Affect: Mood normal.         Behavior: Behavior normal.

## 2024-01-12 NOTE — TELEPHONE ENCOUNTER
FYI to provider - Patient is lost to pap tracking follow-up. Attempts to contact pt have been made per reminder process and there has been no reply and/or no appt scheduled.       09/10/10 LSIL pap, + HPV types 81, 61  10/2010 ASCUS Pap, + HPV 81, 54   02/11/11 ASCUS Pap,+ HPV 53  04/09/14 NIL Pap, Neg HR HPV   07/25/16 ASCUS Pap, + HR HPV type unknown  Saw another doctor and states this is good now - Dr Mcfarlane on [address] (?)   07/12/22 ASC-H Pap, +HR HPV (not 16 or 18) Plan Dalton due bef 10/12/22  07/20/22 Left msg with [facility]ios  Alec and Mochila result note sent. Pt's daughter was advised, consent on file to communicate daughter does not need an .   08/11/22 Dalton Bx WARNER I, ECC single fragment of atypical Epithelium and inflamed stroma. Plan cotest in 1 year due 08/11/23.  Provider released the results and recommendations to the pt through [employer], pt viewed.   06/6/23 LSIL Pap, +HR HPV (not 16 or 18) Plan Dalton due bef 09/6/23 06/14/23 patient's daughter was advised.   8/3/22 Dalton cancelled  08/7/23 Reminder Mychart (2mo)  08/28/23 Dalton not done. Tracking updated for 6 mo colp/pap due 12/6/23 11/22/23 Reminder MyChart   12/19/23 Reminder call-lm (6mo)  1/12/24 lost to follow up    Elaine Vides RN, BSN    Previous outreach sent to provider as FYI with lost to follow up information.

## 2024-01-12 NOTE — TELEPHONE ENCOUNTER
FYI to provider - Patient is lost to pap tracking follow-up. Attempts to contact pt have been made per reminder process and there has been no reply and/or no appt scheduled.       09/10/10 LSIL pap, + HPV types 81, 61  10/2010 ASCUS Pap, + HPV 81, 54   02/11/11 ASCUS Pap,+ HPV 53  04/09/14 NIL Pap, Neg HR HPV   07/25/16 ASCUS Pap, + HR HPV type unknown  Saw another doctor and states this is good now - Dr Mcfarlane on [address] (?)   07/12/22 ASC-H Pap, +HR HPV (not 16 or 18) Plan Noel due bef 10/12/22  07/20/22 Left msg with [employer]  Alec and Dextrys result note sent. Pt's daughter was advised, consent on file to communicate daughter does not need an .   08/11/22 Noel Bx WARNER I, ECC single fragment of atypical Epithelium and inflamed stroma. Plan cotest in 1 year due 08/11/23.  Provider released the results and recommendations to the pt through [employer], pt viewed.   06/6/23 LSIL Pap, +HR HPV (not 16 or 18) Plan Noel due bef 09/6/23 06/14/23 patient's daughter was advised.   8/3/22 Noel cancelled  08/7/23 Reminder Mycellyt (2mo)  08/28/23 Noel not done. Tracking updated for 6 mo colp/pap due 12/6/23 11/22/23 Reminder MyChart   12/19/23 Reminder call-lm (6mo)  1/12/24 lost to follow up    Elaine Vides RN, BSN

## 2024-01-15 ENCOUNTER — HOSPITAL ENCOUNTER (OUTPATIENT)
Dept: MRI IMAGING | Facility: CLINIC | Age: 59
Discharge: HOME | End: 2024-01-15
Attending: NURSE PRACTITIONER | Admitting: NURSE PRACTITIONER
Payer: COMMERCIAL

## 2024-01-15 DIAGNOSIS — R29.810 FACIAL DROOP: ICD-10-CM

## 2024-01-15 PROCEDURE — 255N000002 HC RX 255 OP 636: Performed by: NURSE PRACTITIONER

## 2024-01-15 PROCEDURE — 70553 MRI BRAIN STEM W/O & W/DYE: CPT

## 2024-01-15 PROCEDURE — A9585 GADOBUTROL INJECTION: HCPCS | Performed by: NURSE PRACTITIONER

## 2024-01-15 RX ORDER — GADOBUTROL 604.72 MG/ML
5 INJECTION INTRAVENOUS ONCE
Status: COMPLETED | OUTPATIENT
Start: 2024-01-15 | End: 2024-01-15

## 2024-01-15 RX ADMIN — GADOBUTROL 5 ML: 604.72 INJECTION INTRAVENOUS at 20:29

## 2024-02-12 ENCOUNTER — OFFICE VISIT (OUTPATIENT)
Dept: FAMILY MEDICINE | Facility: CLINIC | Age: 59
End: 2024-02-12
Payer: COMMERCIAL

## 2024-02-12 VITALS
RESPIRATION RATE: 14 BRPM | BODY MASS INDEX: 24.14 KG/M2 | TEMPERATURE: 97.6 F | SYSTOLIC BLOOD PRESSURE: 123 MMHG | OXYGEN SATURATION: 98 % | DIASTOLIC BLOOD PRESSURE: 75 MMHG | HEART RATE: 64 BPM | WEIGHT: 115 LBS | HEIGHT: 58 IN

## 2024-02-12 DIAGNOSIS — G60.9 HEREDITARY AND IDIOPATHIC PERIPHERAL NEUROPATHY: ICD-10-CM

## 2024-02-12 DIAGNOSIS — F41.9 ANXIETY: ICD-10-CM

## 2024-02-12 DIAGNOSIS — Z79.4 TYPE 2 DIABETES MELLITUS WITH DIABETIC NEUROPATHY, WITH LONG-TERM CURRENT USE OF INSULIN (H): Primary | ICD-10-CM

## 2024-02-12 DIAGNOSIS — M25.511 ACUTE PAIN OF RIGHT SHOULDER: ICD-10-CM

## 2024-02-12 DIAGNOSIS — E11.40 TYPE 2 DIABETES MELLITUS WITH DIABETIC NEUROPATHY, WITH LONG-TERM CURRENT USE OF INSULIN (H): Primary | ICD-10-CM

## 2024-02-12 DIAGNOSIS — F32.A DEPRESSIVE DISORDER: ICD-10-CM

## 2024-02-12 DIAGNOSIS — Z12.31 VISIT FOR SCREENING MAMMOGRAM: ICD-10-CM

## 2024-02-12 LAB — HBA1C MFR BLD: 6.4 % (ref 0–5.6)

## 2024-02-12 PROCEDURE — 91320 SARSCV2 VAC 30MCG TRS-SUC IM: CPT | Performed by: NURSE PRACTITIONER

## 2024-02-12 PROCEDURE — 36415 COLL VENOUS BLD VENIPUNCTURE: CPT | Performed by: NURSE PRACTITIONER

## 2024-02-12 PROCEDURE — 90480 ADMN SARSCOV2 VAC 1/ONLY CMP: CPT | Performed by: NURSE PRACTITIONER

## 2024-02-12 PROCEDURE — 99214 OFFICE O/P EST MOD 30 MIN: CPT | Performed by: NURSE PRACTITIONER

## 2024-02-12 PROCEDURE — 83036 HEMOGLOBIN GLYCOSYLATED A1C: CPT | Performed by: NURSE PRACTITIONER

## 2024-02-12 RX ORDER — CYCLOBENZAPRINE HCL 10 MG
10 TABLET ORAL 3 TIMES DAILY PRN
Qty: 20 TABLET | Refills: 0 | Status: SHIPPED | OUTPATIENT
Start: 2024-02-12

## 2024-02-12 RX ORDER — CLONAZEPAM 0.5 MG/1
TABLET ORAL
COMMUNITY

## 2024-02-12 RX ORDER — METFORMIN HCL 500 MG
500 TABLET, EXTENDED RELEASE 24 HR ORAL 2 TIMES DAILY WITH MEALS
Qty: 180 TABLET | Refills: 1 | Status: SHIPPED | OUTPATIENT
Start: 2024-02-12

## 2024-02-12 RX ORDER — ESCITALOPRAM OXALATE 20 MG/1
TABLET ORAL
COMMUNITY

## 2024-02-12 RX ORDER — CAPSAICIN 0.025 %
CREAM (GRAM) TOPICAL
COMMUNITY

## 2024-02-12 ASSESSMENT — PAIN SCALES - GENERAL: PAINLEVEL: NO PAIN (0)

## 2024-02-12 NOTE — PROGRESS NOTES
Assessment and Plan:     Type 2 diabetes mellitus with diabetic neuropathy, with long-term current use of insulin (H)  This is controlled.  Will check A1c.  She continues metformin.  I encouraged yearly eye exams.  - HEMOGLOBIN A1C  - metFORMIN (GLUCOPHAGE XR) 500 MG 24 hr tablet  Dispense: 180 tablet; Refill: 1    Depressive disorder  Anxiety  Patient is followed by psychiatry.  She is prescribed escitalopram and clonazepam as needed.    Peripheral Neuropathy  Patient continues gabapentin.    Acute pain of right shoulder  Patient continues cyclobenzaprine as needed.  She is to avoid taking this with other sedatives.  - cyclobenzaprine (FLEXERIL) 10 MG tablet  Dispense: 20 tablet; Refill: 0    Visit for screening mammogram  - MA SCREENING DIGITAL BILAT - Future  (s+30)        Subjective:     Dedra is a 58 year old female presenting to the clinic for medication management.  Her sister is interpreting for her.  Patient has type 2 diabetes and is taking metformin  mg twice daily.  She has been checking her blood sugars twice daily.  They have been ranging between 120-140.  She is consuming a healthy diet.  She exercises by performing strength training and stretching.  She denies any sores on her feet.  She is unsure of her last eye exam. Last A1c was 6.4% on 6/6/2023.  Last cholesterol check was on 6/6/2023 with a total cholesterol 184, triglycerides 260, HDL 72, LDL 60.  She is not taking a prescription statin.  Patient has a history of peripheral neuropathy and takes gabapentin.  She was seen recently by another provider for right shoulder pain and has been prescribed cyclobenzaprine.  She requests refill today.  She sees psychiatry for depression.    Reviewof Systems: A complete 14 point review of systems was obtained and is negative or as stated in the history of present illness.    Social History     Socioeconomic History    Marital status:      Spouse name: Not on file    Number of children: Not on  file    Years of education: Not on file    Highest education level: Not on file   Occupational History    Not on file   Tobacco Use    Smoking status: Never     Passive exposure: Never    Smokeless tobacco: Never   Vaping Use    Vaping Use: Never used   Substance and Sexual Activity    Alcohol use: No    Drug use: No    Sexual activity: Yes     Partners: Male     Birth control/protection: Post-menopausal   Other Topics Concern    Not on file   Social History Narrative    The patient lives with  and family.  Born in [facility], in [facility] x 13 years, to  1990     Social Determinants of Health     Financial Resource Strain: Low Risk  (1/8/2024)    Financial Resource Strain     Within the past 12 months, have you or your family members you live with been unable to get utilities (heat, electricity) when it was really needed?: No   Food Insecurity: Low Risk  (1/8/2024)    Food Insecurity     Within the past 12 months, did you worry that your food would run out before you got money to buy more?: No     Within the past 12 months, did the food you bought just not last and you didn t have money to get more?: No   Transportation Needs: Low Risk  (1/8/2024)    Transportation Needs     Within the past 12 months, has lack of transportation kept you from medical appointments, getting your medicines, non-medical meetings or appointments, work, or from getting things that you need?: No   Physical Activity: Not on file   Stress: Not on file   Social Connections: Not on file   Interpersonal Safety: Not on file   Housing Stability: High Risk (1/8/2024)    Housing Stability     Do you have housing? 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    Type 2 diabetes mellitus (H) 01/07/2022    Depressive disorder 04/02/2021    Colonoscopy refused 04/02/2021     Resolved Ambulatory Problems     Diagnosis Date Noted    Arthralgia     Hearing loss     Health care maintenance 01/28/2016     No Additional Past Medical History       Family History   Problem Relation Age of Onset    Diabetes Mother     Hearing Loss Mother     No Known Problems Father     No Known Problems Sister     No Known Problems Sister     No Known Problems Sister     No Known Problems Sister     No Known Problems Brother     No Known Problems Brother     No Known Problems Brother        Objective:     /75   Pulse 64   Temp 97.6  F (36.4  C)   Resp 14   Ht 1.473 m (4' 10\")   Wt 52.2 kg (115 lb)   SpO2 98%   Breastfeeding No   BMI 24.04 kg/m      Patient is alert, in no obvious distress.   Skin: Warm, dry.    Neck: Supple, no lymphadenopathy. No thyromegaly.  Lungs:  Clear to auscultation. Respirations even and unlabored.  No wheezing or rales noted.   Heart:  Regular rate and rhythm.  No murmurs, S3, S4, gallops, or rubs.    Musculoskeletal: Monofilament testing is normal bilaterally.  No edema is present in bilateral lower extremities.          "

## 2024-03-02 ENCOUNTER — HEALTH MAINTENANCE LETTER (OUTPATIENT)
Age: 59
End: 2024-03-02

## 2024-03-18 ENCOUNTER — TELEPHONE (OUTPATIENT)
Dept: FAMILY MEDICINE | Facility: CLINIC | Age: 59
End: 2024-03-18

## 2024-03-18 NOTE — TELEPHONE ENCOUNTER
General Call    Contacts         Type Contact Phone/Fax    03/18/2024 02:09 PM CDT Phone (Incoming) JonathanAletha Alec (Emergency Contact) [phone]          Reason for Call:  Daughter is calling to ask questions about her mom to get a form completed so mother can get a free membership to a gym due to her health issues.    What are your questions or concerns:  free membership due to health condition.    Date of last appointment with provider: 2/12/2024    Could we send this information to you in [place] or would you prefer to receive a phone call?:   Patient would prefer a phone call   Okay to leave a detailed message?: Yes at Cell number on file:    Telephone Information:   Mobile [phone]   Mobile [phone]

## 2024-03-19 NOTE — TELEPHONE ENCOUNTER
I let patient know to check with insurance/ Gym to see if there is a specific form that needs to be filled out and if so to drop it off at the clinic's .

## 2024-03-19 NOTE — TELEPHONE ENCOUNTER
Pt should check with insurance/gym to see what form needs to be completed and then drop off at our office for me to complete

## 2024-06-13 ENCOUNTER — PATIENT OUTREACH (OUTPATIENT)
Dept: CARE COORDINATION | Facility: CLINIC | Age: 59
End: 2024-06-13
Payer: COMMERCIAL

## 2024-07-08 ENCOUNTER — MYC MEDICAL ADVICE (OUTPATIENT)
Dept: FAMILY MEDICINE | Facility: CLINIC | Age: 59
End: 2024-07-08
Payer: COMMERCIAL

## 2024-07-08 NOTE — TELEPHONE ENCOUNTER
Patient Quality Outreach    Patient is due for the following:   Diabetes -  Eye Exam  Physical Preventive Adult Physical    Next Steps:   Schedule a Adult Preventative and Diabetic Eye Exam    Type of outreach:    Sent VANCL message.    Next Steps:  Reach out within 90 days via Phone.    Max number of attempts reached: No. Will try again in 90 days if patient still on fail list.    Questions for provider review:    None           Nehemiah Stahl RN

## 2024-07-20 ENCOUNTER — HEALTH MAINTENANCE LETTER (OUTPATIENT)
Age: 59
End: 2024-07-20

## 2024-09-25 ENCOUNTER — APPOINTMENT (OUTPATIENT)
Dept: CT IMAGING | Facility: CLINIC | Age: 59
End: 2024-09-25
Payer: COMMERCIAL

## 2024-09-25 ENCOUNTER — HOSPITAL ENCOUNTER (EMERGENCY)
Facility: CLINIC | Age: 59
Discharge: HOME | End: 2024-09-25
Payer: COMMERCIAL

## 2024-09-25 VITALS
TEMPERATURE: 97.7 F | SYSTOLIC BLOOD PRESSURE: 145 MMHG | RESPIRATION RATE: 18 BRPM | OXYGEN SATURATION: 97 % | HEART RATE: 60 BPM | WEIGHT: 119 LBS | HEIGHT: 59 IN | BODY MASS INDEX: 23.99 KG/M2 | DIASTOLIC BLOOD PRESSURE: 71 MMHG

## 2024-09-25 DIAGNOSIS — E04.1 THYROID NODULE: ICD-10-CM

## 2024-09-25 LAB
ANION GAP SERPL CALCULATED.3IONS-SCNC: 16 MMOL/L (ref 7–15)
BASOPHILS # BLD AUTO: 0 10E3/UL (ref 0–0.2)
BASOPHILS NFR BLD AUTO: 0 %
BUN SERPL-MCNC: 14.2 MG/DL (ref 6–20)
CALCIUM SERPL-MCNC: 9.8 MG/DL (ref 8.8–10.4)
CHLORIDE SERPL-SCNC: 103 MMOL/L (ref 98–107)
CREAT SERPL-MCNC: 0.61 MG/DL (ref 0.51–0.95)
CRP SERPL-MCNC: <3 MG/L
EGFRCR SERPLBLD CKD-EPI 2021: >90 ML/MIN/1.73M2
EOSINOPHIL # BLD AUTO: 0.1 10E3/UL (ref 0–0.7)
EOSINOPHIL NFR BLD AUTO: 2 %
ERYTHROCYTE [DISTWIDTH] IN BLOOD BY AUTOMATED COUNT: 12.4 % (ref 10–15)
ERYTHROCYTE [SEDIMENTATION RATE] IN BLOOD BY WESTERGREN METHOD: 18 MM/HR (ref 0–30)
FLUAV RNA SPEC QL NAA+PROBE: NEGATIVE
FLUBV RNA RESP QL NAA+PROBE: NEGATIVE
GLUCOSE SERPL-MCNC: 108 MG/DL (ref 70–99)
HCO3 SERPL-SCNC: 22 MMOL/L (ref 22–29)
HCT VFR BLD AUTO: 40.3 % (ref 35–47)
HGB BLD-MCNC: 13.2 G/DL (ref 11.7–15.7)
IMM GRANULOCYTES # BLD: 0 10E3/UL
IMM GRANULOCYTES NFR BLD: 0 %
LYMPHOCYTES # BLD AUTO: 2.1 10E3/UL (ref 0.8–5.3)
LYMPHOCYTES NFR BLD AUTO: 39 %
MCH RBC QN AUTO: 28.6 PG (ref 26.5–33)
MCHC RBC AUTO-ENTMCNC: 32.8 G/DL (ref 31.5–36.5)
MCV RBC AUTO: 87 FL (ref 78–100)
MONOCYTES # BLD AUTO: 0.5 10E3/UL (ref 0–1.3)
MONOCYTES NFR BLD AUTO: 9 %
NEUTROPHILS # BLD AUTO: 2.7 10E3/UL (ref 1.6–8.3)
NEUTROPHILS NFR BLD AUTO: 50 %
NRBC # BLD AUTO: 0 10E3/UL
NRBC BLD AUTO-RTO: 0 /100
PLATELET # BLD AUTO: 231 10E3/UL (ref 150–450)
POTASSIUM SERPL-SCNC: 4.2 MMOL/L (ref 3.4–5.3)
RBC # BLD AUTO: 4.62 10E6/UL (ref 3.8–5.2)
RSV RNA SPEC NAA+PROBE: NEGATIVE
SARS-COV-2 RNA RESP QL NAA+PROBE: NEGATIVE
SODIUM SERPL-SCNC: 141 MMOL/L (ref 135–145)
TSH SERPL DL<=0.005 MIU/L-ACNC: 0.95 UIU/ML (ref 0.3–4.2)
WBC # BLD AUTO: 5.4 10E3/UL (ref 4–11)

## 2024-09-25 PROCEDURE — 85652 RBC SED RATE AUTOMATED: CPT

## 2024-09-25 PROCEDURE — 85025 COMPLETE CBC W/AUTO DIFF WBC: CPT

## 2024-09-25 PROCEDURE — 84443 ASSAY THYROID STIM HORMONE: CPT

## 2024-09-25 PROCEDURE — 36415 COLL VENOUS BLD VENIPUNCTURE: CPT

## 2024-09-25 PROCEDURE — 250N000011 HC RX IP 250 OP 636

## 2024-09-25 PROCEDURE — 70491 CT SOFT TISSUE NECK W/DYE: CPT

## 2024-09-25 PROCEDURE — 80048 BASIC METABOLIC PNL TOTAL CA: CPT

## 2024-09-25 PROCEDURE — 99285 EMERGENCY DEPT VISIT HI MDM: CPT | Mod: 25

## 2024-09-25 PROCEDURE — 86140 C-REACTIVE PROTEIN: CPT

## 2024-09-25 PROCEDURE — 87637 SARSCOV2&INF A&B&RSV AMP PRB: CPT

## 2024-09-25 PROCEDURE — 96374 THER/PROPH/DIAG INJ IV PUSH: CPT | Mod: 59

## 2024-09-25 RX ORDER — KETOROLAC TROMETHAMINE 15 MG/ML
15 INJECTION, SOLUTION INTRAMUSCULAR; INTRAVENOUS ONCE
Status: COMPLETED | OUTPATIENT
Start: 2024-09-25 | End: 2024-09-25

## 2024-09-25 RX ORDER — IOPAMIDOL 755 MG/ML
90 INJECTION, SOLUTION INTRAVASCULAR ONCE
Status: COMPLETED | OUTPATIENT
Start: 2024-09-25 | End: 2024-09-25

## 2024-09-25 RX ADMIN — IOPAMIDOL 90 ML: 755 INJECTION, SOLUTION INTRAVENOUS at 21:15

## 2024-09-25 RX ADMIN — KETOROLAC TROMETHAMINE 15 MG: 15 INJECTION, SOLUTION INTRAMUSCULAR; INTRAVENOUS at 20:21

## 2024-09-25 ASSESSMENT — ACTIVITIES OF DAILY LIVING (ADL)
ADLS_ACUITY_SCORE: 35
ADLS_ACUITY_SCORE: 35
ADLS_ACUITY_SCORE: 33
ADLS_ACUITY_SCORE: 35

## 2024-09-25 ASSESSMENT — ENCOUNTER SYMPTOMS
FEVER: 0
HEADACHES: 1
COUGH: 1

## 2024-09-26 NOTE — ED PROVIDER NOTES
EMERGENCY DEPARTMENT ENCOUNTER      NAME: Dedra Montero  AGE: 59 year old female  YOB: 1965  MRN: [id number]  EVALUATION DATE & TIME: 2024  7:25 PM    PCP: Allison Eller    ED PROVIDER: Aliza Oglesby PA-C      Chief Complaint   Patient presents with    Mass         FINAL IMPRESSION:  1. Thyroid nodule          ED COURSE & MEDICAL DECISION MAKIN:45 PM Met with patient for initial interview. Plan for care discussed.  10:41 PM Reevaluated and updated patient. I discussed the plan for discharge with the patient, and patient is agreeable. We discussed supportive cares at home and reasons for return to the ER including new or worsening symptoms. All questions and concerns addressed. Patient to be discharged by RN.    59 year old female with a history of DM II with peripheral neuropathy presents to the Emergency Department for evaluation of painful neck mass.  Per chart review, patient was evaluated at urgent care just prior to arrival and sent here for CT imaging of anterior neck mass.  Upon exam, patient is afebrile, mildly hypertensive, and appears uncomfortable. 1.5 cm tender anterior cervical solid mass just left of midline, mobile. No overlying erythema, warmth, purulence, fluctuance, crepitus. Tolerates secretions. No nuchal rigidity. Full ROM neck without pain. Trachea midline with normal phonation. Differential diagnosis includes but not limited to reactive lymphadenopathy, cancerous mass, thyroid nodule or cyst, thyroid dysfunction, subcutaneous cyst or abscess. No clinical evidence of thyroid storm or myxoedema coma. Based on patient's presenting symptoms, laboratories and imaging were ordered.    CBC without leukocytosis or anemia. BMP with mild anion gap at 16, otherwise unremarkable. TSH WNL. ESR and CRP WNL. COVID/influenza/RSV negative. CT revealed hypodense left thyroid nodule, measuring up to 1.5 cm. Recommend further evaluation with nonemergent thyroid ultrasound.      Patient was treated with Toradol upon arrival with improvement in pain. Symptoms and workup most consistent with thyroid nodule. Patient was made aware of the above findings. Plan to discharge patient home with symptomatic management, strict return precautions, and close follow up with their PCP for reevaluation and ongoing management including thyroid US. The patient was stable and well appearing upon discharge. The patient was advised to return to the ER if any new or worsening symptoms develop. The patient verbalizes understanding and agrees with the plan.     Medical Decision Making  Obtained supplemental history:Supplemental history obtained?: Family Member/Significant Other  Reviewed external records: External records reviewed?: No  Care impacted by chronic illness:Diabetes and Peripheral Vascular Disease  Care significantly affected by social determinants of health:N/A  Did you consider but not order tests?: Work up considered but not performed and documented in chart, if applicable  Did you interpret images independently?: Independent interpretation of ECG and images noted in documentation, when applicable.  Consultation discussion with other provider:Did you involve another provider (consultant, MH, pharmacy, etc.)?: No  Discharge. No recommendations on prescription strength medication(s). See documentation for any additional details.    MEDICATIONS GIVEN IN THE EMERGENCY:  Medications   ketorolac (TORADOL) injection 15 mg (15 mg Intravenous $Given 9/25/24 2021)   iopamidol (ISOVUE-370) solution 90 mL (90 mLs Intravenous $Given 9/25/24 2115)       NEW PRESCRIPTIONS STARTED AT TODAY'S ER VISIT  New Prescriptions    No medications on file          =================================================================    HPI    Patient information was obtained from: Patient and Son    Use of : Yes (Phone) - Language: Trinidad Montero is a 59 year old female with a pertinent history of peripheral  neuropathy and DM type 2 who presents to this ED via private vehicle for evaluation of mass in neck.     Patient first noticed the mass in her neck yesterday (09/24/2024) morning, and notes that it has not changed in size since then. Patient has never noticed this swelling before, and adds that it is painful with palpation. Patient is also having a difficulty swallowing. Patient also has a headache and cough that began a few days before symptom onset. She has been taking Advil for her headache, and denies additional thirst, heat sensitivity, or cold sensitivity.     Patient denies fever, exposure to illness at home, or any other complaints at this time.       REVIEW OF SYSTEMS   Review of Systems   Constitutional:  Negative for fever.   Respiratory:  Positive for cough.    Neurological:  Positive for headaches.   All other systems reviewed and are negative.      PAST MEDICAL HISTORY:  No past medical history on file.    PAST SURGICAL HISTORY:  Past Surgical History:   Procedure Laterality Date    NO PAST SURGERIES             CURRENT MEDICATIONS:    alcohol swab prep pads  benztropine (COGENTIN) 1 MG tablet  capsaicin (ZOSTRIX) 0.025 % external cream  clonazePAM (KLONOPIN) 0.5 MG tablet  cyclobenzaprine (FLEXERIL) 10 MG tablet  escitalopram (LEXAPRO) 20 MG tablet  gabapentin (NEURONTIN) 100 MG capsule  metFORMIN (GLUCOPHAGE XR) 500 MG 24 hr tablet  paliperidone ER (INVEGA) 6 MG 24 hr tablet  thin (NO BRAND SPECIFIED) lancets        ALLERGIES:  No Known Allergies    FAMILY HISTORY:  Family History   Problem Relation Age of Onset    Diabetes Mother     Hearing Loss Mother     No Known Problems Father     No Known Problems Sister     No Known Problems Sister     No Known Problems Sister     No Known Problems Sister     No Known Problems Brother     No Known Problems Brother     No Known Problems Brother        SOCIAL HISTORY:   Social History     Socioeconomic History    Marital status:    Tobacco Use    Smoking  "status: Never     Passive exposure: Never    Smokeless tobacco: Never   Vaping Use    Vaping status: Never Used   Substance and Sexual Activity    Alcohol use: No    Drug use: No    Sexual activity: Yes     Partners: Male     Birth control/protection: Post-menopausal   Social History Narrative    The patient lives with  and family.  Born in [facility], in [facility] x 13 years, to  1990     Social Determinants of Health     Financial Resource Strain: Low Risk  (1/8/2024)    Financial Resource Strain     Within the past 12 months, have you or your family members you live with been unable to get utilities (heat, electricity) when it was really needed?: No   Food Insecurity: Low Risk  (1/8/2024)    Food Insecurity     Within the past 12 months, did you worry that your food would run out before you got money to buy more?: No     Within the past 12 months, did the food you bought just not last and you didn t have money to get more?: No   Transportation Needs: Low Risk  (1/8/2024)    Transportation Needs     Within the past 12 months, has lack of transportation kept you from medical appointments, getting your medicines, non-medical meetings or appointments, work, or from getting things that you need?: No   Housing Stability: High Risk (1/8/2024)    Housing Stability     Do you have housing? : No     Are you worried about losing your housing?: No       VITALS:  BP (!) 144/76   Pulse 60   Temp 97.7  F (36.5  C) (Temporal)   Resp 18   Ht 1.499 m (4' 11\")   Wt 54 kg (119 lb)   SpO2 99%   BMI 24.04 kg/m      PHYSICAL EXAM    Constitutional:  Alert, in no acute distress. Cooperative.  EYES: Conjunctivae clear.   HENT:  Atraumatic, normocephalic. 1.5 cm tender anterior cervical solid mass just left of midline, mobile. No overlying erythema, warmth, purulence, fluctuance, crepitus. Tolerates secretions. No nuchal rigidity. Full ROM neck without pain. Trachea midline with normal phonation. Normal nose. Oropharynx without " erythema, swelling, or exudates. Tonsils 1+ and symmetrical. Uvula midline without edema. No evidence of tonsillar or peritonsillar abscess. Moist membranes. No dental pain or periapical swelling/fluctuance. No submandibular swelling. No trismus. No buccal edema or erythema.   Respiratory:  Respirations even, unlabored, in no acute respiratory distress. Lungs clear to auscultation bilaterally without wheeze, rhonchi, or rales. No cough. Speaks in full sentences easily.  Cardiovascular:  Regular rate and rhythm, good peripheral perfusion. No carotid bruits.   GI: Soft, flat, non-distended.   Musculoskeletal:  No edema. No cyanosis. Range of motion major extremities intact.    Integument: Warm, Dry. No rash to visualized skin.  Neurologic:  Alert & oriented. No focal deficits noted.  Psych: Normal mood and affect.      LAB:  All pertinent labs reviewed and interpreted.  Results for orders placed or performed during the hospital encounter of 09/25/24   Soft tissue neck CT w contrast    Impression    IMPRESSION:   1.  At the site of concern there is a hypodense left thyroid nodule, measuring up to 1.5 cm. Recommend further evaluation with nonemergent thyroid ultrasound.   Basic metabolic panel   Result Value Ref Range    Sodium 141 135 - 145 mmol/L    Potassium 4.2 3.4 - 5.3 mmol/L    Chloride 103 98 - 107 mmol/L    Carbon Dioxide (CO2) 22 22 - 29 mmol/L    Anion Gap 16 (H) 7 - 15 mmol/L    Urea Nitrogen 14.2 6.0 - 20.0 mg/dL    Creatinine 0.61 0.51 - 0.95 mg/dL    GFR Estimate >90 >60 mL/min/1.73m2    Calcium 9.8 8.8 - 10.4 mg/dL    Glucose 108 (H) 70 - 99 mg/dL   TSH with free T4 reflex   Result Value Ref Range    TSH 0.95 0.30 - 4.20 uIU/mL   Erythrocyte sedimentation rate auto   Result Value Ref Range    Erythrocyte Sedimentation Rate 18 0 - 30 mm/hr   Result Value Ref Range    CRP Inflammation <3.00 <5.00 mg/L   Symptomatic Influenza A/B, RSV, & SARS-CoV2 PCR (COVID-19) Nasopharyngeal    Specimen: Nasopharyngeal;  Swab   Result Value Ref Range    Influenza A PCR Negative Negative    Influenza B PCR Negative Negative    RSV PCR Negative Negative    SARS CoV2 PCR Negative Negative   CBC with platelets and differential   Result Value Ref Range    WBC Count 5.4 4.0 - 11.0 10e3/uL    RBC Count 4.62 3.80 - 5.20 10e6/uL    Hemoglobin 13.2 11.7 - 15.7 g/dL    Hematocrit 40.3 35.0 - 47.0 %    MCV 87 78 - 100 fL    MCH 28.6 26.5 - 33.0 pg    MCHC 32.8 31.5 - 36.5 g/dL    RDW 12.4 10.0 - 15.0 %    Platelet Count 231 150 - 450 10e3/uL    % Neutrophils 50 %    % Lymphocytes 39 %    % Monocytes 9 %    % Eosinophils 2 %    % Basophils 0 %    % Immature Granulocytes 0 %    NRBCs per 100 WBC 0 <1 /100    Absolute Neutrophils 2.7 1.6 - 8.3 10e3/uL    Absolute Lymphocytes 2.1 0.8 - 5.3 10e3/uL    Absolute Monocytes 0.5 0.0 - 1.3 10e3/uL    Absolute Eosinophils 0.1 0.0 - 0.7 10e3/uL    Absolute Basophils 0.0 0.0 - 0.2 10e3/uL    Absolute Immature Granulocytes 0.0 <=0.4 10e3/uL    Absolute NRBCs 0.0 10e3/uL       RADIOLOGY:  Reviewed all pertinent imaging. Please see official radiology report.  Soft tissue neck CT w contrast   Final Result   IMPRESSION:    1.  At the site of concern there is a hypodense left thyroid nodule, measuring up to 1.5 cm. Recommend further evaluation with nonemergent thyroid ultrasound.        I, Xavi Yadav, am serving as a scribe to document services personally performed by Aliza Oglesby PA-C based on my observation and the provider's statements to me. I, Aliza Oglesby PA-C, attest that Xavi Yadav is acting in a scribe capacity, has observed my performance of the services and has documented them in accordance with my direction.    Aliza Oglesby PA-C  [facility] EMERGENCY ROOM  [address] [facility] [address] [phone]      Aliza Oglesby PA-C  09/25/24 1996

## 2024-09-26 NOTE — ED TRIAGE NOTES
Pt comes into ER with Son, refused an . Form signed. Son reports that she has a lump/mass in the middle of her neck, her throat hurts.Came from [facility], they are wanting a CT done. Pt did not take any medication for the discomfort.      Triage Assessment (Adult)       Row Name 09/25/24 0052          Triage Assessment    Airway WDL WDL        Respiratory WDL    Respiratory WDL WDL        Skin Circulation/Temperature WDL    Skin Circulation/Temperature WDL WDL        Cardiac WDL    Cardiac WDL WDL        Peripheral/Neurovascular WDL    Peripheral Neurovascular WDL WDL        Cognitive/Neuro/Behavioral WDL    Cognitive/Neuro/Behavioral WDL WDL

## 2024-09-26 NOTE — DISCHARGE INSTRUCTIONS
You were seen in the ER for evaluation of mass and found to have thyroid nodule. Please follow-up with your primary care provider for reevaluation and thyroid ultrasound for further characterization.    Ibuprofen/Naproxen Discharge Instructions:  You may take ibuprofen for pain control.  The maximum dose of (ibuprofen is 3200 mg ) in a 24-hour period.    Take this medication with food to prevent stomach irritation.  With long-term use this medication can irritate the stomach causing pain and lead to development of a stomach ulcer.  If you notice stomach pain or vomiting of coffee-ground colored vomit or blood, please be seen by a healthcare provider.  Attempt to use this medication for the shortest time possible.      Tylenol (Acetaminophen) Discharge Instructions:  You may take 2 tablets of regular strength, over-the-counter, Tylenol (acetaminophen) every 4-6 hours as needed for pain.  Take no more than 4000 mg of Tylenol in a 24-hour period.      Avoid taking more than 1 acetaminophen-containing product at a time and be aware that many over-the-counter medications contain a combination of acetaminophen and other products.  If you are taking Tylenol in addition to a combination product please keep track of your daily acetaminophen dose to make sure you do not exceed the recommended 4000 mg.  Taking too much acetaminophen can cause permanent damage to your liver.    Follow-up with your primary care provider.    Return to the emergency department for any new or worsening symptoms including increased pain, swelling/redness/warmth, difficulty swallowing, fever/chills, chest pain, palpitations, shortness of breath, or any other concerning symptoms.    Take Care!  -Aliza Oglesby PA-C

## 2024-09-27 ENCOUNTER — PATIENT OUTREACH (OUTPATIENT)
Dept: FAMILY MEDICINE | Facility: CLINIC | Age: 59
End: 2024-09-27
Payer: COMMERCIAL

## 2024-09-27 NOTE — TELEPHONE ENCOUNTER
Transitions of Care Outreach  Chief Complaint   Patient presents with    Hospital F/U       Most Recent Admission Date: 9/25/2024   Most Recent Admission Diagnosis:      Most Recent Discharge Date: 9/25/2024   Most Recent Discharge Diagnosis: Thyroid nodule - E04.1     Transitions of Care Assessment    Discharge Assessment  How are you doing now that you are home?: improving  How are your symptoms? (Red Flag symptoms escalate to triage hotline per guidelines): Improved  Do you know how to contact your clinic care team if you have future questions or changes to your health status? : Yes  Does the patient have their discharge instructions? : Yes  Does the patient have questions regarding their discharge instructions? : No  Were you started on any new medications or were there changes to any of your previous medications? : No  Does the patient have all of their medications?: Yes  Do you have questions regarding any of your medications? : No  Do you have all of your needed medical supplies or equipment (DME)?  (i.e. oxygen tank, CPAP, cane, etc.): Yes    Follow up Plan     Discharge Follow-Up  Discharge follow up appointment scheduled in alignment with recommended follow up timeframe or Transitions of Risk Category? (Low = within 30 days; Moderate= within 14 days; High= within 7 days): Yes  Discharge Follow Up Appointment Date: 10/10/24  Discharge Follow Up Appointment Scheduled with?: Primary Care Provider    Future Appointments   Date Time Provider Department Center   10/10/2024  9:00 AM Allison Eller MD [id number]       Outpatient Plan as outlined on AVS reviewed with patient.    For any urgent concerns, please contact our 24 hour nurse triage line: [phone] ([phone])       Linda RICHARD RN      Rhombic Flap Text: The defect edges were debeveled with a #15 scalpel blade.  Given the location of the defect and the proximity to free margins a rhombic flap was deemed most appropriate.  Using a sterile surgical marker, an appropriate rhombic flap was drawn incorporating the defect.    The area thus outlined was incised deep to adipose tissue with a #15 scalpel blade.  The skin margins were undermined to an appropriate distance in all directions utilizing iris scissors.

## 2024-10-07 NOTE — PROGRESS NOTES
Assessment/Plan:    Thyroid nodule  Thyroid nodule diagnosed on CT scan a few weeks ago - needs thyroid US for further evaluation, this was ordered today. Pt expresses desire to have it removed - discussed need to know if benign vs malignant as this would change treatment/management plan.   - US Thyroid    Type 2 diabetes mellitus with diabetic neuropathy, with long-term current use of insulin (H)  Hx DM2, last A1c 6.4% - will check labs as below.   - HEMOGLOBIN A1C  - Lipid panel reflex to direct LDL Non-fasting  - Albumin Random Urine Quantitative with Creat Ratio  - Vitamin D deficiency screening       Follow up: pending test results    Allison Eller MD  [facility]    Subjective:   Dedra Montero is a 59 year old female is here today for ER follow-up     -seen on 9/25 for neck mass - initially went to  and then was sent to ER for imaging - CT shows L thyroid nodule; labs wnl - recommended to have thyroid US done  -pt wants it removed - pt states she still notices it, feeling better now overall but still present    Answers submitted by the patient for this visit:  Patient Health Questionnaire (Submitted on 10/10/2024)  If you checked off any problems, how difficult have these problems made it for you to do your work, take care of things at home, or get along with other people?: Not difficult at all  PHQ9 TOTAL SCORE: 0    Patient Active Problem List   Diagnosis    Peripheral Neuropathy    Papanicolaou smear of cervix with atypical squamous cells cannot exclude high grade squamous intraepithelial lesion (ASC-H)    Chest Pain    Hx of pyelonephritis    Vitamin D deficiency    Apical lung scarring    Type 2 diabetes mellitus (H)    Depressive disorder    Colonoscopy refused     History reviewed. No pertinent past medical history.  Past Surgical History:   Procedure Laterality Date    NO PAST SURGERIES       Current Outpatient Medications   Medication Sig Dispense Refill    alcohol swab prep pads Use to  swab area of injection/daniela as directed 100 each 3    benztropine (COGENTIN) 1 MG tablet       capsaicin (ZOSTRIX) 0.025 % external cream APPLY TO THE AFFECTED AREA(S) BY TOPICAL ROUTE 3 TIMES PER DAY      clonazePAM (KLONOPIN) 0.5 MG tablet       cyclobenzaprine (FLEXERIL) 10 MG tablet Take 1 tablet (10 mg) by mouth 3 times daily as needed for muscle spasms 20 tablet 0    escitalopram (LEXAPRO) 20 MG tablet       gabapentin (NEURONTIN) 100 MG capsule Take 1 capsule (100 mg) by mouth 2 times daily 90 capsule 3    metFORMIN (GLUCOPHAGE XR) 500 MG 24 hr tablet Take 1 tablet (500 mg) by mouth 2 times daily (with meals) 180 tablet 1    paliperidone ER (INVEGA) 6 MG 24 hr tablet       thin (NO BRAND SPECIFIED) lancets Use to test blood sugar as directed for illness/feeling unwell. To accompany: Blood Glucose Monitor Brands: per insurance 1 each 3     No current facility-administered medications for this visit.     No Known Allergies  Social History     Socioeconomic History    Marital status:      Spouse name: Not on file    Number of children: Not on file    Years of education: Not on file    Highest education level: Not on file   Occupational History    Not on file   Tobacco Use    Smoking status: Never     Passive exposure: Never    Smokeless tobacco: Never   Vaping Use    Vaping status: Never Used   Substance and Sexual Activity    Alcohol use: No    Drug use: No    Sexual activity: Yes     Partners: Male     Birth control/protection: Post-menopausal   Other Topics Concern    Not on file   Social History Narrative    The patient lives with  and family.  Born in [facility], in [facility] x 13 years, to  1990     Social Determinants of Health     Financial Resource Strain: Low Risk  (1/8/2024)    Financial Resource Strain     Within the past 12 months, have you or your family members you live with been unable to get utilities (heat, electricity) when it was really needed?: No   Food Insecurity: Low Risk   "(1/8/2024)    Food Insecurity     Within the past 12 months, did you worry that your food would run out before you got money to buy more?: No     Within the past 12 months, did the food you bought just not last and you didn t have money to get more?: No   Transportation Needs: Low Risk  (1/8/2024)    Transportation Needs     Within the past 12 months, has lack of transportation kept you from medical appointments, getting your medicines, non-medical meetings or appointments, work, or from getting things that you need?: No   Physical Activity: Not on file   Stress: Not on file   Social Connections: Not on file   Interpersonal Safety: Low Risk  (10/10/2024)    Interpersonal Safety     Do you feel physically and emotionally safe where you currently live?: Yes     Within the past 12 months, have you been hit, slapped, kicked or otherwise physically hurt by someone?: No     Within the past 12 months, have you been humiliated or emotionally abused in other ways by your partner or ex-partner?: No   Housing Stability: High Risk (1/8/2024)    Housing Stability     Do you have housing? : No     Are you worried about losing your housing?: No     Family History   Problem Relation Age of Onset    Diabetes Mother     Hearing Loss Mother     No Known Problems Father     No Known Problems Sister     No Known Problems Sister     No Known Problems Sister     No Known Problems Sister     No Known Problems Brother     No Known Problems Brother     No Known Problems Brother      Review of systems is as stated in HPI, and the remainder of system review is otherwise negative.    Objective:     /80   Pulse 65   Temp 97.7  F (36.5  C) (Temporal)   Resp 18   Ht 1.499 m (4' 11\")   Wt 55.1 kg (121 lb 6.4 oz)   SpO2 98%   BMI 24.52 kg/m      General appearance: awake, NAD, here with daughter  HEENT: atraumatic, normocephalic, no scleral icterus or injection  Neck: supple, no lymphadenopathy, normal ROM - L thyroid nodule palpated, " nontender  Lungs: breathing comfortably on room air  Neuro: alert, oriented x3, CNs grossly intact, no focal deficits appreciated  Psych: normal mood/affect/behavior, answering questions appropriately, linear thought process

## 2024-10-10 ENCOUNTER — OFFICE VISIT (OUTPATIENT)
Dept: FAMILY MEDICINE | Facility: CLINIC | Age: 59
End: 2024-10-10
Payer: COMMERCIAL

## 2024-10-10 VITALS
WEIGHT: 121.4 LBS | TEMPERATURE: 97.7 F | SYSTOLIC BLOOD PRESSURE: 130 MMHG | HEART RATE: 65 BPM | OXYGEN SATURATION: 98 % | HEIGHT: 59 IN | DIASTOLIC BLOOD PRESSURE: 80 MMHG | BODY MASS INDEX: 24.48 KG/M2 | RESPIRATION RATE: 18 BRPM

## 2024-10-10 DIAGNOSIS — Z79.4 TYPE 2 DIABETES MELLITUS WITH DIABETIC NEUROPATHY, WITH LONG-TERM CURRENT USE OF INSULIN (H): ICD-10-CM

## 2024-10-10 DIAGNOSIS — E11.40 TYPE 2 DIABETES MELLITUS WITH DIABETIC NEUROPATHY, WITH LONG-TERM CURRENT USE OF INSULIN (H): ICD-10-CM

## 2024-10-10 DIAGNOSIS — E04.1 THYROID NODULE: Primary | ICD-10-CM

## 2024-10-10 LAB
CHOLEST SERPL-MCNC: 197 MG/DL
CREAT UR-MCNC: 10 MG/DL
EST. AVERAGE GLUCOSE BLD GHB EST-MCNC: 154 MG/DL
FASTING STATUS PATIENT QL REPORTED: YES
HBA1C MFR BLD: 7 % (ref 0–5.6)
HDLC SERPL-MCNC: 71 MG/DL
LDLC SERPL CALC-MCNC: 95 MG/DL
MICROALBUMIN UR-MCNC: <12 MG/L
MICROALBUMIN/CREAT UR: NORMAL MG/G{CREAT}
NONHDLC SERPL-MCNC: 126 MG/DL
TRIGL SERPL-MCNC: 155 MG/DL

## 2024-10-10 PROCEDURE — 90471 IMMUNIZATION ADMIN: CPT | Performed by: FAMILY MEDICINE

## 2024-10-10 PROCEDURE — 82043 UR ALBUMIN QUANTITATIVE: CPT | Performed by: FAMILY MEDICINE

## 2024-10-10 PROCEDURE — 80061 LIPID PANEL: CPT | Performed by: FAMILY MEDICINE

## 2024-10-10 PROCEDURE — 82306 VITAMIN D 25 HYDROXY: CPT | Performed by: FAMILY MEDICINE

## 2024-10-10 PROCEDURE — 99213 OFFICE O/P EST LOW 20 MIN: CPT | Mod: 25 | Performed by: FAMILY MEDICINE

## 2024-10-10 PROCEDURE — 90673 RIV3 VACCINE NO PRESERV IM: CPT | Performed by: FAMILY MEDICINE

## 2024-10-10 PROCEDURE — 36415 COLL VENOUS BLD VENIPUNCTURE: CPT | Performed by: FAMILY MEDICINE

## 2024-10-10 PROCEDURE — 83036 HEMOGLOBIN GLYCOSYLATED A1C: CPT | Performed by: FAMILY MEDICINE

## 2024-10-10 PROCEDURE — 82570 ASSAY OF URINE CREATININE: CPT | Performed by: FAMILY MEDICINE

## 2024-10-10 ASSESSMENT — PAIN SCALES - GENERAL: PAINLEVEL: NO PAIN (0)

## 2024-10-10 ASSESSMENT — PATIENT HEALTH QUESTIONNAIRE - PHQ9
10. IF YOU CHECKED OFF ANY PROBLEMS, HOW DIFFICULT HAVE THESE PROBLEMS MADE IT FOR YOU TO DO YOUR WORK, TAKE CARE OF THINGS AT HOME, OR GET ALONG WITH OTHER PEOPLE: NOT DIFFICULT AT ALL
SUM OF ALL RESPONSES TO PHQ QUESTIONS 1-9: 0
SUM OF ALL RESPONSES TO PHQ QUESTIONS 1-9: 0

## 2024-10-11 LAB — VIT D+METAB SERPL-MCNC: 30 NG/ML (ref 20–50)

## 2024-12-01 ENCOUNTER — HEALTH MAINTENANCE LETTER (OUTPATIENT)
Age: 59
End: 2024-12-01

## 2025-01-08 ENCOUNTER — PATIENT OUTREACH (OUTPATIENT)
Dept: CARE COORDINATION | Facility: CLINIC | Age: 60
End: 2025-01-08
Payer: COMMERCIAL

## 2025-01-12 ENCOUNTER — HEALTH MAINTENANCE LETTER (OUTPATIENT)
Age: 60
End: 2025-01-12

## 2025-04-26 ENCOUNTER — HEALTH MAINTENANCE LETTER (OUTPATIENT)
Age: 60
End: 2025-04-26

## 2025-08-06 ENCOUNTER — PATIENT OUTREACH (OUTPATIENT)
Dept: FAMILY MEDICINE | Facility: CLINIC | Age: 60
End: 2025-08-06
Payer: COMMERCIAL

## 2025-08-09 ENCOUNTER — HEALTH MAINTENANCE LETTER (OUTPATIENT)
Age: 60
End: 2025-08-09